# Patient Record
Sex: MALE | Race: BLACK OR AFRICAN AMERICAN | NOT HISPANIC OR LATINO | URBAN - METROPOLITAN AREA
[De-identification: names, ages, dates, MRNs, and addresses within clinical notes are randomized per-mention and may not be internally consistent; named-entity substitution may affect disease eponyms.]

---

## 2017-01-04 ENCOUNTER — INPATIENT (INPATIENT)
Facility: HOSPITAL | Age: 77
LOS: 7 days | Discharge: HOME HEALTH | DRG: 682 | End: 2017-01-12
Attending: HOSPITALIST | Admitting: HOSPITALIST
Payer: COMMERCIAL

## 2017-01-04 VITALS
HEIGHT: 78 IN | OXYGEN SATURATION: 98 % | RESPIRATION RATE: 20 BRPM | SYSTOLIC BLOOD PRESSURE: 131 MMHG | HEART RATE: 44 BPM | DIASTOLIC BLOOD PRESSURE: 61 MMHG | TEMPERATURE: 98 F | WEIGHT: 250 LBS

## 2017-01-04 DIAGNOSIS — Z95.810 PRESENCE OF AUTOMATIC (IMPLANTABLE) CARDIAC DEFIBRILLATOR: Chronic | ICD-10-CM

## 2017-01-04 LAB
ALBUMIN SERPL ELPH-MCNC: 3.4 G/DL — SIGNIFICANT CHANGE UP (ref 3.3–5.2)
ALP SERPL-CCNC: 74 U/L — SIGNIFICANT CHANGE UP (ref 40–120)
ALT FLD-CCNC: 20 U/L — SIGNIFICANT CHANGE UP
ANION GAP SERPL CALC-SCNC: 10 MMOL/L — SIGNIFICANT CHANGE UP (ref 5–17)
APTT BLD: 33.3 SEC — SIGNIFICANT CHANGE UP (ref 27.5–37.4)
AST SERPL-CCNC: 44 U/L — HIGH
BASOPHILS # BLD AUTO: 0 K/UL — SIGNIFICANT CHANGE UP (ref 0–0.2)
BASOPHILS NFR BLD AUTO: 0.2 % — SIGNIFICANT CHANGE UP (ref 0–2)
BILIRUB SERPL-MCNC: 0.4 MG/DL — SIGNIFICANT CHANGE UP (ref 0.4–2)
BUN SERPL-MCNC: 57 MG/DL — HIGH (ref 8–20)
CALCIUM SERPL-MCNC: 8.2 MG/DL — LOW (ref 8.6–10.2)
CHLORIDE SERPL-SCNC: 103 MMOL/L — SIGNIFICANT CHANGE UP (ref 98–107)
CK SERPL-CCNC: 93 U/L — SIGNIFICANT CHANGE UP (ref 30–200)
CO2 SERPL-SCNC: 23 MMOL/L — SIGNIFICANT CHANGE UP (ref 22–29)
CREAT SERPL-MCNC: 2.95 MG/DL — HIGH (ref 0.5–1.3)
EOSINOPHIL # BLD AUTO: 0 K/UL — SIGNIFICANT CHANGE UP (ref 0–0.5)
EOSINOPHIL NFR BLD AUTO: 0.8 % — SIGNIFICANT CHANGE UP (ref 0–5)
GLUCOSE SERPL-MCNC: 125 MG/DL — HIGH (ref 70–115)
HCT VFR BLD CALC: 26.8 % — LOW (ref 42–52)
HGB BLD-MCNC: 8 G/DL — LOW (ref 14–18)
INR BLD: 1.66 RATIO — HIGH (ref 0.88–1.16)
LYMPHOCYTES # BLD AUTO: 1.1 K/UL — SIGNIFICANT CHANGE UP (ref 1–4.8)
LYMPHOCYTES # BLD AUTO: 22.4 % — SIGNIFICANT CHANGE UP (ref 20–55)
MCHC RBC-ENTMCNC: 29.5 PG — SIGNIFICANT CHANGE UP (ref 27–31)
MCHC RBC-ENTMCNC: 29.9 G/DL — LOW (ref 32–36)
MCV RBC AUTO: 98.9 FL — HIGH (ref 80–94)
MONOCYTES # BLD AUTO: 0.4 K/UL — SIGNIFICANT CHANGE UP (ref 0–0.8)
MONOCYTES NFR BLD AUTO: 9.3 % — SIGNIFICANT CHANGE UP (ref 3–10)
NEUTROPHILS # BLD AUTO: 3.3 K/UL — SIGNIFICANT CHANGE UP (ref 1.8–8)
NEUTROPHILS NFR BLD AUTO: 67.1 % — SIGNIFICANT CHANGE UP (ref 37–73)
NT-PROBNP SERPL-SCNC: HIGH PG/ML (ref 0–300)
PLATELET # BLD AUTO: 160 K/UL — SIGNIFICANT CHANGE UP (ref 150–400)
POTASSIUM SERPL-MCNC: 5 MMOL/L — SIGNIFICANT CHANGE UP (ref 3.5–5.3)
POTASSIUM SERPL-SCNC: 5 MMOL/L — SIGNIFICANT CHANGE UP (ref 3.5–5.3)
PROT SERPL-MCNC: 8.6 G/DL — SIGNIFICANT CHANGE UP (ref 6.6–8.7)
PROTHROM AB SERPL-ACNC: 18.4 SEC — HIGH (ref 10–13.1)
RBC # BLD: 2.71 M/UL — LOW (ref 4.6–6.2)
RBC # FLD: 16.5 % — HIGH (ref 11–15.6)
SODIUM SERPL-SCNC: 136 MMOL/L — SIGNIFICANT CHANGE UP (ref 135–145)
TROPONIN T SERPL-MCNC: 0.13 NG/ML — CRITICAL HIGH (ref 0–0.06)
WBC # BLD: 4.86 K/UL — SIGNIFICANT CHANGE UP (ref 4.8–10.8)
WBC # FLD AUTO: 4.86 K/UL — SIGNIFICANT CHANGE UP (ref 4.8–10.8)

## 2017-01-04 PROCEDURE — 71010: CPT | Mod: 26

## 2017-01-04 PROCEDURE — 93010 ELECTROCARDIOGRAM REPORT: CPT

## 2017-01-04 NOTE — ED ADULT NURSE NOTE - OBJECTIVE STATEMENT
77 y/o male presents to ED with general "weakness" from home. Pt. is A/O x3 and denies any chest pain or SOB at this time. Pt. has diminished heart sounds with weak thready radial pulses. Capillary refill ,2 seconds. Pt. has clear lung sounds on left side and diminished breath sounds on right lung field upon auscultation. Bi-lateral pitting edema +2  of pt's legs. Pt. is connected to monitor and is sinus bradycardia on the monitor. O2 sat 95% on room air. Pt. has a defibrillator left chest. Pt. abdomen is distended and lower quadrants are tender to touch.

## 2017-01-04 NOTE — ED PROVIDER NOTE - NS ED MD SCRIBE ATTENDING SCRIBE SECTIONS
DISPOSITION/PHYSICAL EXAM/VITAL SIGNS( Pullset)/HISTORY OF PRESENT ILLNESS/REVIEW OF SYSTEMS/PAST MEDICAL/SURGICAL/SOCIAL HISTORY

## 2017-01-04 NOTE — ED PROVIDER NOTE - OBJECTIVE STATEMENT
77 y/o M pt w/ PMHx of defibrillator, CHF, GERD, and CAD was BIB EMS to the ED s/p syncope today. Pt also notes weakness x3 months, worsening today. As per EMS, pt was found on the floor of his house after a syncopal episode; pt states that his wife was helping him inside when he felt severely weak and lost consciousness. Pt states that he has been feeling weak for the past 3 months, but felt severely weak after taking a shower today. Pt denies chest pain, SOB, headache, dizziness, blood in stool, incontinence, or any other complaints. NKDA. Pt notes having bad knees. 75 y/o M pt w/ PMHx of defibrillator, CHF, GERD, and CAD was BIB EMS to the ED s/p syncope today. Pt also notes weakness x3 months, worsening today. As per EMS, pt was found on the floor of his house after a syncopal episode; pt states that his wife was helping him inside when he felt severely weak and lost consciousness. Pt states that he has been feeling weak for the past 3 months, but felt severely weak today. Pt denies chest pain, SOB, headache, dizziness, blood in stool, incontinence, or any other complaints. NKDA. Pt notes having bad knees.

## 2017-01-04 NOTE — ED PROVIDER NOTE - MUSCULOSKELETAL, MLM
Spine appears normal, range of motion is not limited, no muscle or joint tenderness. b/l knee swelling.

## 2017-01-04 NOTE — ED PROVIDER NOTE - PMH
Acute diastolic congestive heart failure    Anemia    CHF (congestive heart failure)    Coronary atherosclerosis    Depressed    Essential hypertension    GERD (gastroesophageal reflux disease)

## 2017-01-04 NOTE — ED PROVIDER NOTE - SKIN, MLM
Skin normal color for race, warm, dry and intact. No evidence of rash. Defibrillator palpated in L upper chest wall.

## 2017-01-05 DIAGNOSIS — N17.9 ACUTE KIDNEY FAILURE, UNSPECIFIED: ICD-10-CM

## 2017-01-05 DIAGNOSIS — R55 SYNCOPE AND COLLAPSE: ICD-10-CM

## 2017-01-05 DIAGNOSIS — I50.21 ACUTE SYSTOLIC (CONGESTIVE) HEART FAILURE: ICD-10-CM

## 2017-01-05 DIAGNOSIS — I10 ESSENTIAL (PRIMARY) HYPERTENSION: ICD-10-CM

## 2017-01-05 DIAGNOSIS — D64.9 ANEMIA, UNSPECIFIED: ICD-10-CM

## 2017-01-05 DIAGNOSIS — R00.1 BRADYCARDIA, UNSPECIFIED: ICD-10-CM

## 2017-01-05 DIAGNOSIS — N18.3 CHRONIC KIDNEY DISEASE, STAGE 3 (MODERATE): ICD-10-CM

## 2017-01-05 DIAGNOSIS — R18.8 OTHER ASCITES: ICD-10-CM

## 2017-01-05 LAB
DIGOXIN SERPL-MCNC: 1.65 NG/ML — SIGNIFICANT CHANGE UP (ref 0.8–2)
TROPONIN T SERPL-MCNC: 0.1 NG/ML — HIGH (ref 0–0.06)

## 2017-01-05 PROCEDURE — 99223 1ST HOSP IP/OBS HIGH 75: CPT

## 2017-01-05 PROCEDURE — 99285 EMERGENCY DEPT VISIT HI MDM: CPT

## 2017-01-05 PROCEDURE — 76775 US EXAM ABDO BACK WALL LIM: CPT | Mod: 26

## 2017-01-05 PROCEDURE — 70450 CT HEAD/BRAIN W/O DYE: CPT | Mod: 26

## 2017-01-05 PROCEDURE — 76705 ECHO EXAM OF ABDOMEN: CPT | Mod: 26

## 2017-01-05 RX ORDER — PANTOPRAZOLE SODIUM 20 MG/1
40 TABLET, DELAYED RELEASE ORAL
Qty: 0 | Refills: 0 | Status: DISCONTINUED | OUTPATIENT
Start: 2017-01-05 | End: 2017-01-12

## 2017-01-05 RX ORDER — HYDRALAZINE HCL 50 MG
10 TABLET ORAL THREE TIMES A DAY
Qty: 0 | Refills: 0 | Status: DISCONTINUED | OUTPATIENT
Start: 2017-01-05 | End: 2017-01-06

## 2017-01-05 RX ORDER — DONEPEZIL HYDROCHLORIDE 10 MG/1
10 TABLET, FILM COATED ORAL AT BEDTIME
Qty: 0 | Refills: 0 | Status: DISCONTINUED | OUTPATIENT
Start: 2017-01-05 | End: 2017-01-12

## 2017-01-05 RX ORDER — DEXTROSE 50 % IN WATER 50 %
12.5 SYRINGE (ML) INTRAVENOUS ONCE
Qty: 0 | Refills: 0 | Status: DISCONTINUED | OUTPATIENT
Start: 2017-01-05 | End: 2017-01-12

## 2017-01-05 RX ORDER — SODIUM CHLORIDE 9 MG/ML
1000 INJECTION, SOLUTION INTRAVENOUS
Qty: 0 | Refills: 0 | Status: DISCONTINUED | OUTPATIENT
Start: 2017-01-05 | End: 2017-01-12

## 2017-01-05 RX ORDER — APIXABAN 2.5 MG/1
1 TABLET, FILM COATED ORAL
Qty: 0 | Refills: 0 | COMMUNITY

## 2017-01-05 RX ORDER — OXYBUTYNIN CHLORIDE 5 MG
1 TABLET ORAL
Qty: 0 | Refills: 0 | COMMUNITY

## 2017-01-05 RX ORDER — FOLIC ACID 0.8 MG
1 TABLET ORAL DAILY
Qty: 0 | Refills: 0 | Status: DISCONTINUED | OUTPATIENT
Start: 2017-01-05 | End: 2017-01-06

## 2017-01-05 RX ORDER — DEXTROSE 50 % IN WATER 50 %
1 SYRINGE (ML) INTRAVENOUS ONCE
Qty: 0 | Refills: 0 | Status: DISCONTINUED | OUTPATIENT
Start: 2017-01-05 | End: 2017-01-12

## 2017-01-05 RX ORDER — DEXTROSE 50 % IN WATER 50 %
25 SYRINGE (ML) INTRAVENOUS ONCE
Qty: 0 | Refills: 0 | Status: DISCONTINUED | OUTPATIENT
Start: 2017-01-05 | End: 2017-01-12

## 2017-01-05 RX ORDER — SODIUM CHLORIDE 9 MG/ML
1000 INJECTION INTRAMUSCULAR; INTRAVENOUS; SUBCUTANEOUS ONCE
Qty: 0 | Refills: 0 | Status: COMPLETED | OUTPATIENT
Start: 2017-01-05 | End: 2017-01-05

## 2017-01-05 RX ORDER — ALBUTEROL 90 UG/1
2 AEROSOL, METERED ORAL
Qty: 0 | Refills: 0 | COMMUNITY

## 2017-01-05 RX ORDER — ISOSORBIDE MONONITRATE 60 MG/1
30 TABLET, EXTENDED RELEASE ORAL DAILY
Qty: 0 | Refills: 0 | Status: DISCONTINUED | OUTPATIENT
Start: 2017-01-05 | End: 2017-01-12

## 2017-01-05 RX ORDER — OXYBUTYNIN CHLORIDE 5 MG
5 TABLET ORAL THREE TIMES A DAY
Qty: 0 | Refills: 0 | Status: DISCONTINUED | OUTPATIENT
Start: 2017-01-05 | End: 2017-01-12

## 2017-01-05 RX ORDER — ALBUTEROL 90 UG/1
1 AEROSOL, METERED ORAL
Qty: 0 | Refills: 0 | Status: DISCONTINUED | OUTPATIENT
Start: 2017-01-05 | End: 2017-01-05

## 2017-01-05 RX ORDER — CITALOPRAM 10 MG/1
20 TABLET, FILM COATED ORAL DAILY
Qty: 0 | Refills: 0 | Status: DISCONTINUED | OUTPATIENT
Start: 2017-01-05 | End: 2017-01-12

## 2017-01-05 RX ORDER — BUDESONIDE AND FORMOTEROL FUMARATE DIHYDRATE 160; 4.5 UG/1; UG/1
1 AEROSOL RESPIRATORY (INHALATION)
Qty: 0 | Refills: 0 | Status: DISCONTINUED | OUTPATIENT
Start: 2017-01-05 | End: 2017-01-12

## 2017-01-05 RX ORDER — FERROUS SULFATE 325(65) MG
325 TABLET ORAL DAILY
Qty: 0 | Refills: 0 | Status: DISCONTINUED | OUTPATIENT
Start: 2017-01-05 | End: 2017-01-12

## 2017-01-05 RX ORDER — INSULIN LISPRO 100/ML
VIAL (ML) SUBCUTANEOUS
Qty: 0 | Refills: 0 | Status: DISCONTINUED | OUTPATIENT
Start: 2017-01-05 | End: 2017-01-12

## 2017-01-05 RX ORDER — BUDESONIDE AND FORMOTEROL FUMARATE DIHYDRATE 160; 4.5 UG/1; UG/1
2 AEROSOL RESPIRATORY (INHALATION)
Qty: 0 | Refills: 0 | COMMUNITY

## 2017-01-05 RX ORDER — TAMSULOSIN HYDROCHLORIDE 0.4 MG/1
0.4 CAPSULE ORAL AT BEDTIME
Qty: 0 | Refills: 0 | Status: DISCONTINUED | OUTPATIENT
Start: 2017-01-05 | End: 2017-01-12

## 2017-01-05 RX ORDER — AMITRIPTYLINE HCL 25 MG
1 TABLET ORAL
Qty: 0 | Refills: 0 | COMMUNITY

## 2017-01-05 RX ORDER — METOPROLOL TARTRATE 50 MG
1 TABLET ORAL
Qty: 0 | Refills: 0 | COMMUNITY

## 2017-01-05 RX ORDER — SODIUM CHLORIDE 9 MG/ML
1000 INJECTION INTRAMUSCULAR; INTRAVENOUS; SUBCUTANEOUS
Qty: 0 | Refills: 0 | Status: DISCONTINUED | OUTPATIENT
Start: 2017-01-05 | End: 2017-01-06

## 2017-01-05 RX ORDER — ALBUTEROL 90 UG/1
1 AEROSOL, METERED ORAL
Qty: 0 | Refills: 0 | Status: DISCONTINUED | OUTPATIENT
Start: 2017-01-05 | End: 2017-01-12

## 2017-01-05 RX ORDER — ATORVASTATIN CALCIUM 80 MG/1
20 TABLET, FILM COATED ORAL AT BEDTIME
Qty: 0 | Refills: 0 | Status: DISCONTINUED | OUTPATIENT
Start: 2017-01-05 | End: 2017-01-12

## 2017-01-05 RX ORDER — HEPARIN SODIUM 5000 [USP'U]/ML
5000 INJECTION INTRAVENOUS; SUBCUTANEOUS EVERY 8 HOURS
Qty: 0 | Refills: 0 | Status: DISCONTINUED | OUTPATIENT
Start: 2017-01-06 | End: 2017-01-06

## 2017-01-05 RX ORDER — SITAGLIPTIN 50 MG/1
1 TABLET, FILM COATED ORAL
Qty: 0 | Refills: 0 | COMMUNITY

## 2017-01-05 RX ORDER — GLUCAGON INJECTION, SOLUTION 0.5 MG/.1ML
1 INJECTION, SOLUTION SUBCUTANEOUS ONCE
Qty: 0 | Refills: 0 | Status: DISCONTINUED | OUTPATIENT
Start: 2017-01-05 | End: 2017-01-12

## 2017-01-05 RX ADMIN — SODIUM CHLORIDE 1000 MILLILITER(S): 9 INJECTION INTRAMUSCULAR; INTRAVENOUS; SUBCUTANEOUS at 06:29

## 2017-01-05 RX ADMIN — PANTOPRAZOLE SODIUM 40 MILLIGRAM(S): 20 TABLET, DELAYED RELEASE ORAL at 11:38

## 2017-01-05 RX ADMIN — SODIUM CHLORIDE 50 MILLILITER(S): 9 INJECTION INTRAMUSCULAR; INTRAVENOUS; SUBCUTANEOUS at 17:58

## 2017-01-05 RX ADMIN — Medication 325 MILLIGRAM(S): at 11:44

## 2017-01-05 RX ADMIN — CITALOPRAM 20 MILLIGRAM(S): 10 TABLET, FILM COATED ORAL at 11:44

## 2017-01-05 RX ADMIN — Medication 10 MILLIGRAM(S): at 23:38

## 2017-01-05 RX ADMIN — Medication 5 MILLIGRAM(S): at 23:39

## 2017-01-05 RX ADMIN — Medication 1 MILLIGRAM(S): at 11:38

## 2017-01-05 RX ADMIN — ATORVASTATIN CALCIUM 20 MILLIGRAM(S): 80 TABLET, FILM COATED ORAL at 23:39

## 2017-01-05 RX ADMIN — TAMSULOSIN HYDROCHLORIDE 0.4 MILLIGRAM(S): 0.4 CAPSULE ORAL at 23:39

## 2017-01-05 RX ADMIN — ISOSORBIDE MONONITRATE 30 MILLIGRAM(S): 60 TABLET, EXTENDED RELEASE ORAL at 11:38

## 2017-01-05 RX ADMIN — Medication 5 MILLIGRAM(S): at 13:45

## 2017-01-05 RX ADMIN — Medication 10 MILLIGRAM(S): at 13:45

## 2017-01-05 RX ADMIN — DONEPEZIL HYDROCHLORIDE 10 MILLIGRAM(S): 10 TABLET, FILM COATED ORAL at 23:43

## 2017-01-05 NOTE — CONSULT NOTE ADULT - PROBLEM SELECTOR RECOMMENDATION 9
- continue to hold diuretics  - gentle IVF and watch for signs of CHF  - renal sono and urine studies

## 2017-01-05 NOTE — H&P ADULT. - ASSESSMENT
1. near syncope likely sec to dehydration sec to diuretics- hold diuretics.avoid nephrotoxics. renal cx as he will need renal outpatient also.   will do ua and u c/s to r/o infection  2. CHf sys chr ef 20%- per family has aicd- called cards to interrogation of device given ekg with bradycardia. holding avn blockers for now. telemonitoring  3. jaclyn on ckd 3- as above  4. Dm- is on ohgs at home. will do insulin here.  5. htn- controlled. cont home meds. except the above held ones and monitor  6. Pafib- on eliquis. since jaclyn on ckd, likely not a candidate for eliquis, need cards to address same. held for now.  7. ? ascites reported. if needed during stay will assess same.  DVT prophyalx- heparin from tomorrow 1. near syncope likely sec to dehydration sec to diuretics- hold diuretics.avoid nephrotoxics. renal cx as he will need renal outpatient also.   will do ua and u c/s to r/o infection  2. CHf sys chr ef 20%- per family has aicd- called cards to interrogation of device given ekg with bradycardia. holding avn blockers for now. telemonitoring  3. jaclyn on ckd 3- as above  4. Dm- is on ohgs at home. will do insulin here.  5. htn- controlled. cont home meds. except the above held ones and monitor  6. Pafib- on eliquis. since jaclyn on ckd, likely not a candidate for eliquis, need cards to address same. held for now.  7. ascites - confirm prior to IR procedure  DVT prophyalx- heparin from tomorrow

## 2017-01-05 NOTE — ED ADULT NURSE REASSESSMENT NOTE - NS ED NURSE REASSESS COMMENT FT1
Pt. sleeping at this time. Vitals are within patients baseline
Pt.'s wife leaving for the night and requested to be updated when pt. is being moved to admitted floor. Phone number is in pt. info.
pt resting comfortably vss resp even unlabored
pt given 2 apple juices and some small amount of breakfast @ 1111
rc'd pt from nigh tshift asleep but arousable vss resp evn unlabored will monitor

## 2017-01-05 NOTE — H&P ADULT. - HISTORY OF PRESENT ILLNESS
pt presents with- he went to work yesterday and then called his wife to say eh was feeling weak. when he drove back home he couldnt get out of the car and just sat down by the car, and his wife with neighbors helped him up and called 911. he has had his doctors appt with cards 3 days ago- was told all Ok. urology f/u informed him that he had ascites. denies any other associated symptoms  PMH- CHF- ef 20% has aicd,DM htn, pafib on eliquis, bph, ? gib  sh- lives with wife, denies habits

## 2017-01-05 NOTE — CONSULT NOTE ADULT - SUBJECTIVE AND OBJECTIVE BOX
Patient is a 76y old  Male who presents with a chief complaint of weakness. near syncope (05 Jan 2017 09:36)      HPI:  The pt is a 75yo B male who presented with profound weakness and near syncopal episode.  We are called regarding renal failure seen on admitting blood work.  Pt unsure regarding renal disease in past.      PAST MEDICAL & SURGICAL HISTORY:  CHF (congestive heart failure)  Depressed  GERD (gastroesophageal reflux disease)  Coronary atherosclerosis  Anemia  Acute diastolic congestive heart failure  Essential hypertension  AICD (automatic cardioverter/defibrillator) present  PAF  Hyperlipidemia    FAMILY HISTORY:  No pertinent family history in first degree relatives      Social History:    MEDICATIONS  (STANDING):  tamsulosin 0.4milliGRAM(s) Oral at bedtime  isosorbide   mononitrate ER Tablet (IMDUR) 30milliGRAM(s) Oral daily  citalopram 20milliGRAM(s) Oral daily  atorvastatin 20milliGRAM(s) Oral at bedtime  buDESOnide 160 MICROgram(s)/formoterol 4.5 MICROgram(s) Inhaler 1Puff(s) Inhalation two times a day  donepezil 10milliGRAM(s) Oral at bedtime  pantoprazole    Tablet 40milliGRAM(s) Oral before breakfast  oxybutynin 5milliGRAM(s) Oral three times a day  folic acid 1milliGRAM(s) Oral daily  hydrALAZINE 10milliGRAM(s) Oral three times a day  insulin lispro (HumaLOG) corrective regimen sliding scale  SubCutaneous three times a day before meals  dextrose 5%. 1000milliLiter(s) IV Continuous <Continuous>  dextrose 50% Injectable 12.5Gram(s) IV Push once  dextrose 50% Injectable 25Gram(s) IV Push once  dextrose 50% Injectable 25Gram(s) IV Push once  ferrous    sulfate 325milliGRAM(s) Oral daily    MEDICATIONS  (PRN):  dextrose Gel 1Dose(s) Oral once PRN Blood Glucose LESS THAN 70 milliGRAM(s)/deciliter  glucagon  Injectable 1milliGRAM(s) IntraMuscular once PRN Glucose LESS THAN 70 milligrams/deciliter  ALBUTerol    90 MICROgram(s) HFA Inhaler 1Puff(s) Inhalation four times a day PRN Shortness of Breath and/or Wheezing      Allergies    No Known Allergies    Intolerances        REVIEW OF SYSTEMS:    CONSTITUTIONAL: + fatigue  EYES: No eye pain, visual disturbances, or discharge  ENMT:  No difficulty hearing, tinnitus, vertigo; No sinus or throat pain  NECK: No pain or stiffness  RESPIRATORY: No cough, wheezing, chills or hemoptysis; No shortness of breath  CARDIOVASCULAR: No chest pain, palpitations, dizziness, or leg swelling  GASTROINTESTINAL: No abdominal or epigastric pain. No nausea, vomiting, or hematemesis; No diarrhea or constipation. No melena or hematochezia.  GENITOURINARY: No dysuria, frequency, hematuria, or incontinence  NEUROLOGICAL: Weakness, dizzyness  SKIN: No itching, burning, rashes, or lesions   LYMPH NODES: No enlarged glands  ENDOCRINE: No heat or cold intolerance; No hair loss  MUSCULOSKELETAL: No joint pain or swelling; No muscle, back, or extremity pain      Vital Signs Last 24 Hrs  T(C): 36.4, Max: 36.8 (01-04 @ 21:33)  T(F): 97.5, Max: 98.2 (01-04 @ 21:33)  HR: 55 (40 - 56)  BP: 144/67 (130/67 - 167/90)  BP(mean): --  RR: 16 (16 - 20)  SpO2: 97% (96% - 98%)    PHYSICAL EXAM:    GENERAL: NAD  HEAD:  Atraumatic, Normocephalic  EYES: EOMI, PERRLA, conjunctiva and sclera clear  ENMT: Dry mucous membranes   NECK: Supple, No JVD  NERVOUS SYSTEM:  Alert & Oriented X3  CHEST/LUNG: Clear to percussion bilaterally; No rales, rhonchi, wheezing, or rubs  HEART: No rub  ABDOMEN: Soft, Nontender, Nondistended; Bowel sounds present  EXTREMITIES:  2+ Peripheral Pulses, No clubbing, cyanosis, or edema      LABS:                        8.0    4.86  )-----------( 160      ( 04 Jan 2017 22:03 )             26.8     04 Jan 2017 22:03    136    |  103    |  57.0   ----------------------------<  125    5.0     |  23.0   |  2.95     Ca    8.2        04 Jan 2017 22:03    TPro  8.6    /  Alb  3.4    /  TBili  0.4    /  DBili  x      /  AST  44     /  ALT  20     /  AlkPhos  74     04 Jan 2017 22:03    PT/INR - ( 04 Jan 2017 22:03 )   PT: 18.4 sec;   INR: 1.66 ratio         PTT - ( 04 Jan 2017 22:03 )  PTT:33.3 sec        RADIOLOGY & ADDITIONAL TESTS:   EXAM:  CHEST SINGLE VIEW FRONTAL                          PROCEDURE DATE:  01/04/2017        INTERPRETATION:    INDICATION: 76-year-old status post fall vascular congestion.    Single frontal view of chest dated 1/4/2017 10:16 PM.  Prior radiographs   of 10/1/2016.    FINDINGS /   IMPRESSION:     There are bibasilar atelectasis and probable tiny pleural effusion. There   is cardiomegaly. There is pacemaker distal leads in region of right   ventricle.    There are degenerative changes.

## 2017-01-05 NOTE — CONSULT NOTE ADULT - PROBLEM SELECTOR RECOMMENDATION 3
ok to hold diuretics for now as per renal. follow creatine. Ascites and low cardiac output could be sharing in HAZEL.

## 2017-01-05 NOTE — CONSULT NOTE ADULT - PROBLEM SELECTOR RECOMMENDATION 9
primarily right sided heart failure ( acites, LE edema ) , with no evidence of pulmonary congestion. Can hold off diuretics for now due to HAZEL , likely also sharing in HAZEL is  low cardiac output 2ry to RV dysfunction. continue imdur , hydralazine , hold off BB (for bradycardia ) and continue holding ACEI and digoxin

## 2017-01-05 NOTE — CONSULT NOTE ADULT - ASSESSMENT
A 77 y/o male with PMH of NICM EF 20-25% with markedly enlarged RV and moderate RV systolic dysfunction and severe TR presents with generalized weakness and right sided heart failure as well as HAZEL

## 2017-01-06 DIAGNOSIS — I50.21 ACUTE SYSTOLIC (CONGESTIVE) HEART FAILURE: ICD-10-CM

## 2017-01-06 LAB
ALBUMIN FLD-MCNC: 1.8 G/DL — SIGNIFICANT CHANGE UP
ANION GAP SERPL CALC-SCNC: 13 MMOL/L — SIGNIFICANT CHANGE UP (ref 5–17)
B PERT IGG+IGM PNL SER: ABNORMAL
BUN SERPL-MCNC: 59 MG/DL — HIGH (ref 8–20)
CALCIUM SERPL-MCNC: 8.5 MG/DL — LOW (ref 8.6–10.2)
CHLORIDE SERPL-SCNC: 102 MMOL/L — SIGNIFICANT CHANGE UP (ref 98–107)
CO2 SERPL-SCNC: 21 MMOL/L — LOW (ref 22–29)
COLOR FLD: ABNORMAL
CREAT SERPL-MCNC: 2.53 MG/DL — HIGH (ref 0.5–1.3)
FLUID INTAKE SUBSTANCE CLASS: SIGNIFICANT CHANGE UP
FLUID SEGMENTED GRANULOCYTES: 9 % — SIGNIFICANT CHANGE UP
GLUCOSE FLD-MCNC: 109 MG/DL — SIGNIFICANT CHANGE UP
GLUCOSE SERPL-MCNC: 105 MG/DL — SIGNIFICANT CHANGE UP (ref 70–115)
GRAM STN FLD: SIGNIFICANT CHANGE UP
HBA1C BLD-MCNC: 5.2 % — SIGNIFICANT CHANGE UP (ref 4–5.6)
LYMPHOCYTES # FLD: 71 % — SIGNIFICANT CHANGE UP
MESOTHL CELL # FLD: 15 % — SIGNIFICANT CHANGE UP
MONOS+MACROS # FLD: 5 % — SIGNIFICANT CHANGE UP
POTASSIUM SERPL-MCNC: 5.4 MMOL/L — HIGH (ref 3.5–5.3)
POTASSIUM SERPL-SCNC: 5.4 MMOL/L — HIGH (ref 3.5–5.3)
PROT FLD-MCNC: 4.4 G/DL — SIGNIFICANT CHANGE UP
RCV VOL RI: 2285 /UL — HIGH (ref 0–5)
SODIUM SERPL-SCNC: 136 MMOL/L — SIGNIFICANT CHANGE UP (ref 135–145)
SPECIMEN SOURCE: SIGNIFICANT CHANGE UP
TOTAL NUCLEATED CELL COUNT, BODY FLUID: 212 /UL — HIGH (ref 0–5)
TUBE TYPE: SIGNIFICANT CHANGE UP

## 2017-01-06 PROCEDURE — 99233 SBSQ HOSP IP/OBS HIGH 50: CPT

## 2017-01-06 PROCEDURE — 49083 ABD PARACENTESIS W/IMAGING: CPT

## 2017-01-06 PROCEDURE — 88112 CYTOPATH CELL ENHANCE TECH: CPT | Mod: 26

## 2017-01-06 PROCEDURE — 88305 TISSUE EXAM BY PATHOLOGIST: CPT | Mod: 26

## 2017-01-06 RX ORDER — METOPROLOL TARTRATE 50 MG
50 TABLET ORAL DAILY
Qty: 0 | Refills: 0 | Status: DISCONTINUED | OUTPATIENT
Start: 2017-01-06 | End: 2017-01-12

## 2017-01-06 RX ORDER — ALBUMIN HUMAN 25 %
100 VIAL (ML) INTRAVENOUS ONCE
Qty: 0 | Refills: 0 | Status: COMPLETED | OUTPATIENT
Start: 2017-01-06 | End: 2017-01-06

## 2017-01-06 RX ORDER — MORPHINE SULFATE 50 MG/1
2 CAPSULE, EXTENDED RELEASE ORAL ONCE
Qty: 0 | Refills: 0 | Status: DISCONTINUED | OUTPATIENT
Start: 2017-01-06 | End: 2017-01-06

## 2017-01-06 RX ORDER — HYDRALAZINE HCL 50 MG
25 TABLET ORAL THREE TIMES A DAY
Qty: 0 | Refills: 0 | Status: DISCONTINUED | OUTPATIENT
Start: 2017-01-06 | End: 2017-01-12

## 2017-01-06 RX ORDER — FOLIC ACID 0.8 MG
1 TABLET ORAL DAILY
Qty: 0 | Refills: 0 | Status: DISCONTINUED | OUTPATIENT
Start: 2017-01-06 | End: 2017-01-12

## 2017-01-06 RX ORDER — APIXABAN 2.5 MG/1
2.5 TABLET, FILM COATED ORAL
Qty: 0 | Refills: 0 | Status: DISCONTINUED | OUTPATIENT
Start: 2017-01-06 | End: 2017-01-12

## 2017-01-06 RX ORDER — INFLUENZA VIRUS VACCINE 15; 15; 15; 15 UG/.5ML; UG/.5ML; UG/.5ML; UG/.5ML
0.5 SUSPENSION INTRAMUSCULAR ONCE
Qty: 0 | Refills: 0 | Status: COMPLETED | OUTPATIENT
Start: 2017-01-06 | End: 2017-01-06

## 2017-01-06 RX ORDER — SODIUM POLYSTYRENE SULFONATE 4.1 MEQ/G
30 POWDER, FOR SUSPENSION ORAL ONCE
Qty: 0 | Refills: 0 | Status: COMPLETED | OUTPATIENT
Start: 2017-01-06 | End: 2017-01-06

## 2017-01-06 RX ORDER — ERYTHROPOIETIN 10000 [IU]/ML
20000 INJECTION, SOLUTION INTRAVENOUS; SUBCUTANEOUS
Qty: 0 | Refills: 0 | Status: DISCONTINUED | OUTPATIENT
Start: 2017-01-06 | End: 2017-01-12

## 2017-01-06 RX ADMIN — MORPHINE SULFATE 2 MILLIGRAM(S): 50 CAPSULE, EXTENDED RELEASE ORAL at 03:40

## 2017-01-06 RX ADMIN — ATORVASTATIN CALCIUM 20 MILLIGRAM(S): 80 TABLET, FILM COATED ORAL at 21:32

## 2017-01-06 RX ADMIN — ERYTHROPOIETIN 20000 UNIT(S): 10000 INJECTION, SOLUTION INTRAVENOUS; SUBCUTANEOUS at 18:40

## 2017-01-06 RX ADMIN — Medication 10 MILLIGRAM(S): at 06:11

## 2017-01-06 RX ADMIN — SODIUM POLYSTYRENE SULFONATE 30 GRAM(S): 4.1 POWDER, FOR SUSPENSION ORAL at 13:04

## 2017-01-06 RX ADMIN — Medication 50 MILLIGRAM(S): at 13:04

## 2017-01-06 RX ADMIN — Medication 5 MILLIGRAM(S): at 06:10

## 2017-01-06 RX ADMIN — Medication 5 MILLIGRAM(S): at 21:32

## 2017-01-06 RX ADMIN — CITALOPRAM 20 MILLIGRAM(S): 10 TABLET, FILM COATED ORAL at 13:00

## 2017-01-06 RX ADMIN — SODIUM CHLORIDE 50 MILLILITER(S): 9 INJECTION INTRAMUSCULAR; INTRAVENOUS; SUBCUTANEOUS at 11:39

## 2017-01-06 RX ADMIN — DONEPEZIL HYDROCHLORIDE 10 MILLIGRAM(S): 10 TABLET, FILM COATED ORAL at 21:31

## 2017-01-06 RX ADMIN — HEPARIN SODIUM 5000 UNIT(S): 5000 INJECTION INTRAVENOUS; SUBCUTANEOUS at 06:11

## 2017-01-06 RX ADMIN — Medication 1 MILLIGRAM(S): at 13:00

## 2017-01-06 RX ADMIN — Medication 25 MILLIGRAM(S): at 21:32

## 2017-01-06 RX ADMIN — Medication 10 MILLIGRAM(S): at 13:04

## 2017-01-06 RX ADMIN — APIXABAN 2.5 MILLIGRAM(S): 2.5 TABLET, FILM COATED ORAL at 21:32

## 2017-01-06 RX ADMIN — PANTOPRAZOLE SODIUM 40 MILLIGRAM(S): 20 TABLET, DELAYED RELEASE ORAL at 06:11

## 2017-01-06 RX ADMIN — MORPHINE SULFATE 2 MILLIGRAM(S): 50 CAPSULE, EXTENDED RELEASE ORAL at 02:42

## 2017-01-06 RX ADMIN — Medication 50 MILLILITER(S): at 20:40

## 2017-01-06 RX ADMIN — Medication 325 MILLIGRAM(S): at 13:00

## 2017-01-06 RX ADMIN — BUDESONIDE AND FORMOTEROL FUMARATE DIHYDRATE 1 PUFF(S): 160; 4.5 AEROSOL RESPIRATORY (INHALATION) at 21:15

## 2017-01-06 RX ADMIN — TAMSULOSIN HYDROCHLORIDE 0.4 MILLIGRAM(S): 0.4 CAPSULE ORAL at 21:31

## 2017-01-06 RX ADMIN — Medication 5 MILLIGRAM(S): at 13:00

## 2017-01-06 RX ADMIN — ISOSORBIDE MONONITRATE 30 MILLIGRAM(S): 60 TABLET, EXTENDED RELEASE ORAL at 13:00

## 2017-01-06 NOTE — PROGRESS NOTE ADULT - SUBJECTIVE AND OBJECTIVE BOX
NEPHROLOGY INTERVAL HPI/OVERNIGHT EVENTS:  cardio evaluation noted   No SOB laying flat  ACE, diuretics, dig held  C/O Abd distention , No pain . + BM     MEDICATIONS  (STANDING):  tamsulosin 0.4milliGRAM(s) Oral at bedtime  isosorbide   mononitrate ER Tablet (IMDUR) 30milliGRAM(s) Oral daily  citalopram 20milliGRAM(s) Oral daily  atorvastatin 20milliGRAM(s) Oral at bedtime  buDESOnide 160 MICROgram(s)/formoterol 4.5 MICROgram(s) Inhaler 1Puff(s) Inhalation two times a day  donepezil 10milliGRAM(s) Oral at bedtime  pantoprazole    Tablet 40milliGRAM(s) Oral before breakfast  oxybutynin 5milliGRAM(s) Oral three times a day  folic acid 1milliGRAM(s) Oral daily  hydrALAZINE 10milliGRAM(s) Oral three times a day  heparin  Injectable 5000Unit(s) SubCutaneous every 8 hours  insulin lispro (HumaLOG) corrective regimen sliding scale  SubCutaneous three times a day before meals  dextrose 5%. 1000milliLiter(s) IV Continuous <Continuous>  dextrose 50% Injectable 12.5Gram(s) IV Push once  dextrose 50% Injectable 25Gram(s) IV Push once  dextrose 50% Injectable 25Gram(s) IV Push once  ferrous    sulfate 325milliGRAM(s) Oral daily  sodium chloride 0.9%. 1000milliLiter(s) IV Continuous <Continuous>  influenza   Vaccine 0.5milliLiter(s) IntraMuscular once    MEDICATIONS  (PRN):  dextrose Gel 1Dose(s) Oral once PRN Blood Glucose LESS THAN 70 milliGRAM(s)/deciliter  glucagon  Injectable 1milliGRAM(s) IntraMuscular once PRN Glucose LESS THAN 70 milligrams/deciliter  ALBUTerol    90 MICROgram(s) HFA Inhaler 1Puff(s) Inhalation four times a day PRN Shortness of Breath and/or Wheezing      Allergies    No Known Allergies    Intolerances            Vital Signs Last 24 Hrs  T(C): 36.7, Max: 36.7 (01-05 @ 20:42)  T(F): 98, Max: 98 (01-05 @ 20:42)  HR: 56 (46 - 56)  BP: 159/82 (144/67 - 166/95)  BP(mean): --  RR: 18 (16 - 19)  SpO2: 98% (97% - 98%)  Daily     Daily   I&O's Detail    I&O's Summary      PHYSICAL EXAM:  HEAD:  Atraumatic, No edema   EYES: EOMI, PERRLA, conjunctiva and sclera clear  ENMT: Dry mucous membranes   NECK: Supple, No JVD  CHEST/LUNG: EAE , no wheeze  HEART: No rub, + rocky   ABDOMEN: Distended , soft . No rigidity or rebound   EXT ++ edema     LABS:                        8.0    4.86  )-----------( 160      ( 04 Jan 2017 22:03 )             26.8     06 Jan 2017 08:53    136    |  102    |  59.0   ----------------------------<  105    5.4     |  21.0   |  2.53     Ca    8.5        06 Jan 2017 08:53    TPro  8.6    /  Alb  3.4    /  TBili  0.4    /  DBili  x      /  AST  44     /  ALT  20     /  AlkPhos  74     04 Jan 2017 22:03    PT/INR - ( 04 Jan 2017 22:03 )   PT: 18.4 sec;   INR: 1.66 ratio         PTT - ( 04 Jan 2017 22:03 )  PTT:33.3 sec            RADIOLOGY & ADDITIONAL TESTS:

## 2017-01-06 NOTE — PROGRESS NOTE ADULT - ASSESSMENT
Assessment and Recommendation:   · Assessment		  CRF(III) ==> Screat =1.4mg/dl in Oct 2016  ARF likely due to dehydration from overdiuresis    Problem/Recommendation - 1:  Problem: CRF (chronic renal failure), stage 3 (moderate). Recommendation: - continue to hold diuretics  - gentle IVF and watch for signs of CHF  - NO hydro , + ascites on ultrasound  ACE , lasix and digoxin on hold  Consider future paracentesis  cardiology follow up noted   hydralazine and Imdur ALR  Dig level OK     Problem/Recommendation - 2:  ·  Problem: Anemia, unspecified type.  Recommendation: - check Fe stores and serum immunofixation.   Add epogen and folate

## 2017-01-06 NOTE — PROVIDER CONTACT NOTE (OTHER) - SITUATION
dr adair called to clarify eloquis order. as per dr adair okay to give eloquis tonight. as per Dr Adair also ok to D/C IVF but to consult with medicine prior
dr vargas called and notified of BP.

## 2017-01-06 NOTE — PROVIDER CONTACT NOTE (OTHER) - ACTION/TREATMENT ORDERED:
as per Cresencio kauffman to given po medications as pt is NPO.
as per dr vargas d/c IVF. administer albumin asap
jennifer rescheduled for 2200. dr vargas called to review plan of care. telephone order received from Dr Vargas to D/C IVF

## 2017-01-06 NOTE — PROGRESS NOTE ADULT - ASSESSMENT
A 75 y/o male with PMH of NICM EF 20-25% with markedly enlarged RV and moderate RV systolic dysfunction and severe TR presents with generalized weakness and right sided heart failure as well as HAZEL

## 2017-01-06 NOTE — PROGRESS NOTE ADULT - SUBJECTIVE AND OBJECTIVE BOX
HEALTH ISSUES - PROBLEM Dx:  right heart failure with ascites and jaclyn on ckd likely sec to diuresis    PAST MEDICAL & SURGICAL HISTORY:  CHF (congestive heart failure)  Depressed  GERD (gastroesophageal reflux disease)  Coronary atherosclerosis  Anemia  Acute diastolic congestive heart failure  Essential hypertension  AICD (automatic cardioverter/defibrillator) present      INTERVAL HPI/ OVERNIGHT EVENTS:  feels comfortable today, and better. concerned about his ascites and finds it difficult to manage with breathing and ADLs sec to same.  denies chest pain, nausea, vomits, cough, SOB, fever, HA    MEDICATIONS  (STANDING):  tamsulosin 0.4milliGRAM(s) Oral at bedtime  isosorbide   mononitrate ER Tablet (IMDUR) 30milliGRAM(s) Oral daily  citalopram 20milliGRAM(s) Oral daily  atorvastatin 20milliGRAM(s) Oral at bedtime  buDESOnide 160 MICROgram(s)/formoterol 4.5 MICROgram(s) Inhaler 1Puff(s) Inhalation two times a day  donepezil 10milliGRAM(s) Oral at bedtime  pantoprazole    Tablet 40milliGRAM(s) Oral before breakfast  oxybutynin 5milliGRAM(s) Oral three times a day  folic acid 1milliGRAM(s) Oral daily  hydrALAZINE 10milliGRAM(s) Oral three times a day  heparin  Injectable 5000Unit(s) SubCutaneous every 8 hours  insulin lispro (HumaLOG) corrective regimen sliding scale  SubCutaneous three times a day before meals  dextrose 5%. 1000milliLiter(s) IV Continuous <Continuous>  dextrose 50% Injectable 12.5Gram(s) IV Push once  dextrose 50% Injectable 25Gram(s) IV Push once  dextrose 50% Injectable 25Gram(s) IV Push once  ferrous    sulfate 325milliGRAM(s) Oral daily  sodium chloride 0.9%. 1000milliLiter(s) IV Continuous <Continuous>  influenza   Vaccine 0.5milliLiter(s) IntraMuscular once  epoetin damon Injectable 85187Fnym(s) SubCutaneous every 7 days  folic acid 1milliGRAM(s) Oral daily    MEDICATIONS  (PRN):  dextrose Gel 1Dose(s) Oral once PRN Blood Glucose LESS THAN 70 milliGRAM(s)/deciliter  glucagon  Injectable 1milliGRAM(s) IntraMuscular once PRN Glucose LESS THAN 70 milligrams/deciliter  ALBUTerol    90 MICROgram(s) HFA Inhaler 1Puff(s) Inhalation four times a day PRN Shortness of Breath and/or Wheezing      Allergies    No Known Allergies    Intolerances        Vital Signs Last 24 Hrs  T(C): 36.7, Max: 36.7 (01-05 @ 20:42)  T(F): 98, Max: 98 (01-05 @ 20:42)  HR: 54 (46 - 56)  BP: 148/52 (144/67 - 166/95)  BP(mean): --  RR: 16 (16 - 19)  SpO2: 98% (97% - 98%)    ACCUCHECKS    PHYSICAL EXAM-  GENERAL: NAD, well-groomed, well-developed  HEAD:  Atraumatic, Normocephalic  EYES: EOMI, PERRLA, conjunctiva and sclera clear  ENMT: No tonsillar erythema, exudates, or enlargement; Moist mucous membranes, Good dentition, No lesions  NECK: Supple, No JVD, Normal thyroid  NERVOUS SYSTEM:  Alert & Oriented X 3, Motor Strength 3-4/5 B/L upper and lower extremities;   CHEST/LUNG: Clear to percussion bilaterally; No rales, rhonchi, wheezing, or rubs  HEART: Regular rate and rhythm; No  rubs, or gallops; sys murmur +  ABDOMEN: Soft, Nontender, gross ascites; Bowel sounds present  EXTREMITIES:  2+ Peripheral Pulses, No clubbing, cyanosis, mild edema, chr skin changes, dry skin +  LYMPH: No lymphadenopathy noted  SKIN: No rashes or lesions    LABS:                        8.0    4.86  )-----------( 160      ( 04 Jan 2017 22:03 )             26.8     06 Jan 2017 08:53    136    |  102    |  59.0   ----------------------------<  105    5.4     |  21.0   |  2.53     Ca    8.5        06 Jan 2017 08:53    TPro  8.6    /  Alb  3.4    /  TBili  0.4    /  DBili  x      /  AST  44     /  ALT  20     /  AlkPhos  74     04 Jan 2017 22:03    PT/INR - ( 04 Jan 2017 22:03 )   PT: 18.4 sec;   INR: 1.66 ratio         PTT - ( 04 Jan 2017 22:03 )  PTT:33.3 sec    RADIOLOGY & ADDITIONAL TESTS:    Assessment and Plan  1. near syncope likely sec to dehydration sec to diuretics- hold diuretics.avoid nephrotoxics. renal cx as he will need renal outpatient also.   per renal no further diuresis. gentle IVF started. ace i and dig still on hold.  2. CHf sys chr ef 20%- per family has aicd- per cards rate not an issue. will resume BB  3. jaclyn on ckd 3- as above  4. Dm- is on ohgs at home. will do insulin here.  5. htn- controlled. cont home meds. except the above held ones and monitor  6. Pafib- on eliquis. since jaclyn on ckd, likely not a candidate for eliquis, need cards to address same. held for now.  7. ascites - going for IR paracentesis today. Should help improve renal fxn and then will address ace i  8. Arrythmia on tele- dig toxicity ruled out with therapeutic levels  9. hyperkalemia sec to jaclyn- will give kayexalate and monitor on tele for arrythmia. sugars controlled.  DVT prophyalx- heparin from today  Discussed with: Patient, family, RN, CM, Consultants  Plan of care/ Discharge planning discussed.    Visit Time: 50 mins

## 2017-01-07 LAB
ANION GAP SERPL CALC-SCNC: 9 MMOL/L — SIGNIFICANT CHANGE UP (ref 5–17)
BUN SERPL-MCNC: 52 MG/DL — HIGH (ref 8–20)
CALCIUM SERPL-MCNC: 8.5 MG/DL — LOW (ref 8.6–10.2)
CHLORIDE SERPL-SCNC: 107 MMOL/L — SIGNIFICANT CHANGE UP (ref 98–107)
CO2 SERPL-SCNC: 23 MMOL/L — SIGNIFICANT CHANGE UP (ref 22–29)
CREAT SERPL-MCNC: 2.16 MG/DL — HIGH (ref 0.5–1.3)
GLUCOSE SERPL-MCNC: 87 MG/DL — SIGNIFICANT CHANGE UP (ref 70–115)
HCT VFR BLD CALC: 26.9 % — LOW (ref 42–52)
HGB BLD-MCNC: 8.5 G/DL — LOW (ref 14–18)
MAGNESIUM SERPL-MCNC: 2.4 MG/DL — SIGNIFICANT CHANGE UP (ref 1.8–2.5)
MCHC RBC-ENTMCNC: 30.4 PG — SIGNIFICANT CHANGE UP (ref 27–31)
MCHC RBC-ENTMCNC: 31.6 G/DL — LOW (ref 32–36)
MCV RBC AUTO: 96.1 FL — HIGH (ref 80–94)
PLATELET # BLD AUTO: 140 K/UL — LOW (ref 150–400)
POTASSIUM SERPL-MCNC: 4.6 MMOL/L — SIGNIFICANT CHANGE UP (ref 3.5–5.3)
POTASSIUM SERPL-SCNC: 4.6 MMOL/L — SIGNIFICANT CHANGE UP (ref 3.5–5.3)
RBC # BLD: 2.8 M/UL — LOW (ref 4.6–6.2)
RBC # FLD: 15.9 % — HIGH (ref 11–15.6)
SODIUM SERPL-SCNC: 139 MMOL/L — SIGNIFICANT CHANGE UP (ref 135–145)
WBC # BLD: 4.27 K/UL — LOW (ref 4.8–10.8)
WBC # FLD AUTO: 4.27 K/UL — LOW (ref 4.8–10.8)

## 2017-01-07 PROCEDURE — 99232 SBSQ HOSP IP/OBS MODERATE 35: CPT

## 2017-01-07 PROCEDURE — 93010 ELECTROCARDIOGRAM REPORT: CPT

## 2017-01-07 RX ORDER — SODIUM CHLORIDE 9 MG/ML
1000 INJECTION INTRAMUSCULAR; INTRAVENOUS; SUBCUTANEOUS
Qty: 0 | Refills: 0 | Status: DISCONTINUED | OUTPATIENT
Start: 2017-01-07 | End: 2017-01-08

## 2017-01-07 RX ADMIN — Medication 25 MILLIGRAM(S): at 13:02

## 2017-01-07 RX ADMIN — BUDESONIDE AND FORMOTEROL FUMARATE DIHYDRATE 1 PUFF(S): 160; 4.5 AEROSOL RESPIRATORY (INHALATION) at 21:31

## 2017-01-07 RX ADMIN — Medication 25 MILLIGRAM(S): at 21:15

## 2017-01-07 RX ADMIN — APIXABAN 2.5 MILLIGRAM(S): 2.5 TABLET, FILM COATED ORAL at 11:57

## 2017-01-07 RX ADMIN — ISOSORBIDE MONONITRATE 30 MILLIGRAM(S): 60 TABLET, EXTENDED RELEASE ORAL at 11:59

## 2017-01-07 RX ADMIN — ATORVASTATIN CALCIUM 20 MILLIGRAM(S): 80 TABLET, FILM COATED ORAL at 21:14

## 2017-01-07 RX ADMIN — SODIUM CHLORIDE 50 MILLILITER(S): 9 INJECTION INTRAMUSCULAR; INTRAVENOUS; SUBCUTANEOUS at 17:04

## 2017-01-07 RX ADMIN — APIXABAN 2.5 MILLIGRAM(S): 2.5 TABLET, FILM COATED ORAL at 21:14

## 2017-01-07 RX ADMIN — DONEPEZIL HYDROCHLORIDE 10 MILLIGRAM(S): 10 TABLET, FILM COATED ORAL at 21:15

## 2017-01-07 RX ADMIN — CITALOPRAM 20 MILLIGRAM(S): 10 TABLET, FILM COATED ORAL at 11:59

## 2017-01-07 RX ADMIN — BUDESONIDE AND FORMOTEROL FUMARATE DIHYDRATE 1 PUFF(S): 160; 4.5 AEROSOL RESPIRATORY (INHALATION) at 08:48

## 2017-01-07 RX ADMIN — Medication 5 MILLIGRAM(S): at 21:15

## 2017-01-07 RX ADMIN — Medication 325 MILLIGRAM(S): at 11:58

## 2017-01-07 RX ADMIN — PANTOPRAZOLE SODIUM 40 MILLIGRAM(S): 20 TABLET, DELAYED RELEASE ORAL at 12:00

## 2017-01-07 RX ADMIN — Medication 5 MILLIGRAM(S): at 12:00

## 2017-01-07 RX ADMIN — Medication 50 MILLIGRAM(S): at 12:00

## 2017-01-07 RX ADMIN — TAMSULOSIN HYDROCHLORIDE 0.4 MILLIGRAM(S): 0.4 CAPSULE ORAL at 21:15

## 2017-01-07 RX ADMIN — Medication 1 MILLIGRAM(S): at 11:58

## 2017-01-07 NOTE — PROGRESS NOTE ADULT - ASSESSMENT
Assessment and Plan  1. near syncope likely sec to dehydration sec to diuretics- hold diuretics. avoid nephrotoxics,gentle IVF started per nephrology.Diuretics on hold  2. CHf sys chr ef 20%- per family has aicd- per cards rate not an issue.   3. jaclyn on ckd 3- as above  4. Dm- is on ohgs at home. will do insulin here.  5. htn- controlled. cont home meds. except the above held ones and monitor  6. Pafib- on eliquis. since jaclyn on ckd, likely not a candidate for eliquis, need cards to address same. held for now.  7. ascites -s/p  IR paracentesis  8. Arrythmia on tele- dig toxicity ruled out with therapeutic levels,on eliquis    DVT prophyalx- on eliquis

## 2017-01-07 NOTE — PROGRESS NOTE ADULT - SUBJECTIVE AND OBJECTIVE BOX
NEPHROLOGY INTERVAL HPI/OVERNIGHT EVENTS:    MEDICATIONS  (STANDING):  tamsulosin 0.4milliGRAM(s) Oral at bedtime  isosorbide   mononitrate ER Tablet (IMDUR) 30milliGRAM(s) Oral daily  citalopram 20milliGRAM(s) Oral daily  atorvastatin 20milliGRAM(s) Oral at bedtime  buDESOnide 160 MICROgram(s)/formoterol 4.5 MICROgram(s) Inhaler 1Puff(s) Inhalation two times a day  donepezil 10milliGRAM(s) Oral at bedtime  pantoprazole    Tablet 40milliGRAM(s) Oral before breakfast  oxybutynin 5milliGRAM(s) Oral three times a day  insulin lispro (HumaLOG) corrective regimen sliding scale  SubCutaneous three times a day before meals  dextrose 5%. 1000milliLiter(s) IV Continuous <Continuous>  dextrose 50% Injectable 12.5Gram(s) IV Push once  dextrose 50% Injectable 25Gram(s) IV Push once  dextrose 50% Injectable 25Gram(s) IV Push once  ferrous    sulfate 325milliGRAM(s) Oral daily  influenza   Vaccine 0.5milliLiter(s) IntraMuscular once  epoetin damon Injectable [id number](s) SubCutaneous every 7 days  folic acid 1milliGRAM(s) Oral daily  metoprolol succinate ER 50milliGRAM(s) Oral daily  hydrALAZINE 25milliGRAM(s) Oral three times a day  apixaban 2.5milliGRAM(s) Oral two times a day    MEDICATIONS  (PRN):  dextrose Gel 1Dose(s) Oral once PRN Blood Glucose LESS THAN 70 milliGRAM(s)/deciliter  glucagon  Injectable 1milliGRAM(s) IntraMuscular once PRN Glucose LESS THAN 70 milligrams/deciliter  ALBUTerol    90 MICROgram(s) HFA Inhaler 1Puff(s) Inhalation four times a day PRN Shortness of Breath and/or Wheezing      Allergies    No Known Allergies    Intolerances        Vital Signs Last 24 Hrs  T(C): 36.7, Max: 36.9 ( @ 18:36)  T(F): 98, Max: 98.4 ( @ 18:36)  HR: 68 (66 - 81)  BP: 130/70 (122/78 - 180/110)  BP(mean): --  RR: 18 (16 - 18)  SpO2: 95% (95% - 99%)  Daily     Daily Weight in k.4 (2017 05:25)    PHYSICAL EXAM:      LABS:                        8.5    4.27  )-----------( 140      ( 2017 06:15 )             26.9     2017 06:15    139    |  107    |  52.0   ----------------------------<  87     4.6     |  23.0   |  2.16     Ca    8.5        2017 06:15  Mg     2.4       2017 06:15          Magnesium, Serum: 2.4 mg/dL ( @ 06:15)          RADIOLOGY & ADDITIONAL TESTS: NEPHROLOGY INTERVAL HPI/OVERNIGHT EVENTS:    cardio evaluation noted   No SOB laying flat      MEDICATIONS  (STANDING):  tamsulosin 0.4milliGRAM(s) Oral at bedtime  isosorbide   mononitrate ER Tablet (IMDUR) 30milliGRAM(s) Oral daily  citalopram 20milliGRAM(s) Oral daily  atorvastatin 20milliGRAM(s) Oral at bedtime  buDESOnide 160 MICROgram(s)/formoterol 4.5 MICROgram(s) Inhaler 1Puff(s) Inhalation two times a day  donepezil 10milliGRAM(s) Oral at bedtime  pantoprazole    Tablet 40milliGRAM(s) Oral before breakfast  oxybutynin 5milliGRAM(s) Oral three times a day  insulin lispro (HumaLOG) corrective regimen sliding scale  SubCutaneous three times a day before meals  dextrose 5%. 1000milliLiter(s) IV Continuous <Continuous>  dextrose 50% Injectable 12.5Gram(s) IV Push once  dextrose 50% Injectable 25Gram(s) IV Push once  dextrose 50% Injectable 25Gram(s) IV Push once  ferrous    sulfate 325milliGRAM(s) Oral daily  influenza   Vaccine 0.5milliLiter(s) IntraMuscular once  epoetin damon Injectable [id number](s) SubCutaneous every 7 days  folic acid 1milliGRAM(s) Oral daily  metoprolol succinate ER 50milliGRAM(s) Oral daily  hydrALAZINE 25milliGRAM(s) Oral three times a day  apixaban 2.5milliGRAM(s) Oral two times a day    MEDICATIONS  (PRN):  dextrose Gel 1Dose(s) Oral once PRN Blood Glucose LESS THAN 70 milliGRAM(s)/deciliter  glucagon  Injectable 1milliGRAM(s) IntraMuscular once PRN Glucose LESS THAN 70 milligrams/deciliter  ALBUTerol    90 MICROgram(s) HFA Inhaler 1Puff(s) Inhalation four times a day PRN Shortness of Breath and/or Wheezing      Allergies    No Known Allergies    Intolerances        Vital Signs Last 24 Hrs  T(C): 36.7, Max: 36.9 ( @ 18:36)  T(F): 98, Max: 98.4 ( @ 18:36)  HR: 68 (66 - 81)  BP: 130/70 (122/78 - 180/110)  BP(mean): --  RR: 18 (16 - 18)  SpO2: 95% (95% - 99%)  Daily     Daily Weight in k.4 (2017 05:25)    PHYSICAL EXAM:  HEAD:  Atraumatic, No edema   EYES: EOMI, PERRLA, conjunctiva and sclera clear  ENMT: Dry mucous membranes   NECK: Supple, No JVD  CHEST/LUNG: EAE , no wheeze  HEART: No rub, + rocky   ABDOMEN: Distended , soft . No rigidity or rebound   EXT ++ edema       LABS:                        8.5    4.27  )-----------( 140      ( 2017 06:15 )             26.9     2017 06:15    139    |  107    |  52.0   ----------------------------<  87     4.6     |  23.0   |  2.16     Ca    8.5        2017 06:15  Mg     2.4       2017 06:15          Magnesium, Serum: 2.4 mg/dL ( @ 06:15)          RADIOLOGY & ADDITIONAL TESTS:

## 2017-01-07 NOTE — PROGRESS NOTE ADULT - ASSESSMENT
HAZEL on CKD III  continue to hold diuretics Cr trending down  - gentle IVF and watch for signs of CHF  - NO hydro , + ascites on ultrasound  ACE , lasix and digoxin on hold  paracentesis done yest  cardiology follow up noted

## 2017-01-07 NOTE — PROGRESS NOTE ADULT - SUBJECTIVE AND OBJECTIVE BOX
CHIEF COMPLAINT/INTERVAL HISTORY:    Patient is a 76y old  Male who presents with a chief complaint of weakness. near syncope (05 Jan 2017 09:36)      HPI:  pt presents with- he went to work yesterday and then called his wife to say eh was feeling weak. when he drove back home he couldnt get out of the car and just sat down by the car, and his wife with neighbors helped him up and called 911. he has had his doctors appt with cards 3 days ago- was told all Ok. urology f/u informed him that he had ascites. denies any other associated symptoms  PMH- CHF- ef 20% has aicd,DM htn, pafib on eliquis, bph, ? gib  sh- lives with wife, denies habits (05 Jan 2017 09:36)      SUBJECTIVE & OBJECTIVE: Pt seen and examined at bedside. ,more comfortable s/p paracentesis says "I want to go home". denies any dyspnea, chest or abdo pain ,fevr or chills    ICU Vital Signs Last 24 Hrs  T(C): 36.7, Max: 36.7 (01-07 @ 12:08)  T(F): 98, Max: 98 (01-07 @ 12:08)  HR: 68 (68 - 80)  BP: 100/64 (100/64 - 140/82)  BP(mean): --  ABP: --  ABP(mean): --  RR: 18 (18 - 18)  SpO2: 99% (95% - 99%)        MEDICATIONS  (STANDING):  tamsulosin 0.4milliGRAM(s) Oral at bedtime  isosorbide   mononitrate ER Tablet (IMDUR) 30milliGRAM(s) Oral daily  citalopram 20milliGRAM(s) Oral daily  atorvastatin 20milliGRAM(s) Oral at bedtime  buDESOnide 160 MICROgram(s)/formoterol 4.5 MICROgram(s) Inhaler 1Puff(s) Inhalation two times a day  donepezil 10milliGRAM(s) Oral at bedtime  pantoprazole    Tablet 40milliGRAM(s) Oral before breakfast  oxybutynin 5milliGRAM(s) Oral three times a day  insulin lispro (HumaLOG) corrective regimen sliding scale  SubCutaneous three times a day before meals  dextrose 5%. 1000milliLiter(s) IV Continuous <Continuous>  dextrose 50% Injectable 12.5Gram(s) IV Push once  dextrose 50% Injectable 25Gram(s) IV Push once  dextrose 50% Injectable 25Gram(s) IV Push once  ferrous    sulfate 325milliGRAM(s) Oral daily  influenza   Vaccine 0.5milliLiter(s) IntraMuscular once  epoetin damon Injectable [id number](s) SubCutaneous every 7 days  folic acid 1milliGRAM(s) Oral daily  metoprolol succinate ER 50milliGRAM(s) Oral daily  hydrALAZINE 25milliGRAM(s) Oral three times a day  apixaban 2.5milliGRAM(s) Oral two times a day  sodium chloride 0.9%. 1000milliLiter(s) IV Continuous <Continuous>    MEDICATIONS  (PRN):  dextrose Gel 1Dose(s) Oral once PRN Blood Glucose LESS THAN 70 milliGRAM(s)/deciliter  glucagon  Injectable 1milliGRAM(s) IntraMuscular once PRN Glucose LESS THAN 70 milligrams/deciliter  ALBUTerol    90 MICROgram(s) HFA Inhaler 1Puff(s) Inhalation four times a day PRN Shortness of Breath and/or Wheezing        PHYSICAL EXAM:    GENERAL: NAD, well-groomed, well-developed  HEAD:  Atraumatic, Normocephalic  EYES: EOMI, PERRLA, conjunctiva and sclera clear  ENMT: Moist mucous membranes  NECK: Supple, No JVD  NERVOUS SYSTEM:  Alert & Oriented X3, Motor Strength 5/5 B/L upper and lower extremities; DTRs 2+ intact and symmetric  CHEST/LUNG: Clear to auscultation bilaterally; No rales, rhonchi, wheezing, or rubs  HEART: Regular rate and rhythm; No murmurs, rubs, or gallops  ABDOMEN: Soft, Nontender, Nondistended; Bowel sounds present  EXTREMITIES:  2+ Peripheral Pulses, No clubbing, cyanosis, or edema    LABS:                        8.5    4.27  )-----------( 140      ( 07 Jan 2017 06:15 )             26.9     07 Jan 2017 06:15    139    |  107    |  52.0   ----------------------------<  87     4.6     |  23.0   |  2.16     Ca    8.5        07 Jan 2017 06:15  Mg     2.4       07 Jan 2017 06:15            CAPILLARY BLOOD GLUCOSE  105 (07 Jan 2017 17:14)  95 (07 Jan 2017 12:08)  85 (07 Jan 2017 08:24)  109 (06 Jan 2017 21:25)      RECENT CULTURES:  Peritoneal fluid c/s-NG so far      RADIOLOGY & ADDITIONAL TESTS:

## 2017-01-08 LAB
ANION GAP SERPL CALC-SCNC: 7 MMOL/L — SIGNIFICANT CHANGE UP (ref 5–17)
BUN SERPL-MCNC: 38 MG/DL — HIGH (ref 8–20)
CALCIUM SERPL-MCNC: 8.2 MG/DL — LOW (ref 8.6–10.2)
CHLORIDE SERPL-SCNC: 106 MMOL/L — SIGNIFICANT CHANGE UP (ref 98–107)
CO2 SERPL-SCNC: 24 MMOL/L — SIGNIFICANT CHANGE UP (ref 22–29)
CREAT SERPL-MCNC: 1.82 MG/DL — HIGH (ref 0.5–1.3)
GLUCOSE SERPL-MCNC: 116 MG/DL — HIGH (ref 70–115)
INTERPRETATION SERPL IFE-IMP: SIGNIFICANT CHANGE UP
LDH SERPL L TO P-CCNC: 104 U/L — SIGNIFICANT CHANGE UP
NIGHT BLUE STAIN TISS: SIGNIFICANT CHANGE UP
POTASSIUM SERPL-MCNC: 4.8 MMOL/L — SIGNIFICANT CHANGE UP (ref 3.5–5.3)
POTASSIUM SERPL-SCNC: 4.8 MMOL/L — SIGNIFICANT CHANGE UP (ref 3.5–5.3)
SODIUM SERPL-SCNC: 137 MMOL/L — SIGNIFICANT CHANGE UP (ref 135–145)
SPECIMEN SOURCE: SIGNIFICANT CHANGE UP

## 2017-01-08 PROCEDURE — 99232 SBSQ HOSP IP/OBS MODERATE 35: CPT

## 2017-01-08 RX ADMIN — ISOSORBIDE MONONITRATE 30 MILLIGRAM(S): 60 TABLET, EXTENDED RELEASE ORAL at 12:25

## 2017-01-08 RX ADMIN — APIXABAN 2.5 MILLIGRAM(S): 2.5 TABLET, FILM COATED ORAL at 10:48

## 2017-01-08 RX ADMIN — Medication 20 MILLIGRAM(S): at 17:25

## 2017-01-08 RX ADMIN — Medication 5 MILLIGRAM(S): at 05:37

## 2017-01-08 RX ADMIN — Medication 25 MILLIGRAM(S): at 05:37

## 2017-01-08 RX ADMIN — BUDESONIDE AND FORMOTEROL FUMARATE DIHYDRATE 1 PUFF(S): 160; 4.5 AEROSOL RESPIRATORY (INHALATION) at 09:08

## 2017-01-08 RX ADMIN — APIXABAN 2.5 MILLIGRAM(S): 2.5 TABLET, FILM COATED ORAL at 21:34

## 2017-01-08 RX ADMIN — Medication 5 MILLIGRAM(S): at 13:14

## 2017-01-08 RX ADMIN — TAMSULOSIN HYDROCHLORIDE 0.4 MILLIGRAM(S): 0.4 CAPSULE ORAL at 21:34

## 2017-01-08 RX ADMIN — CITALOPRAM 20 MILLIGRAM(S): 10 TABLET, FILM COATED ORAL at 12:25

## 2017-01-08 RX ADMIN — Medication 325 MILLIGRAM(S): at 12:25

## 2017-01-08 RX ADMIN — Medication 25 MILLIGRAM(S): at 13:14

## 2017-01-08 RX ADMIN — ATORVASTATIN CALCIUM 20 MILLIGRAM(S): 80 TABLET, FILM COATED ORAL at 21:34

## 2017-01-08 RX ADMIN — Medication 5 MILLIGRAM(S): at 21:34

## 2017-01-08 RX ADMIN — Medication 50 MILLIGRAM(S): at 05:37

## 2017-01-08 RX ADMIN — Medication 1 MILLIGRAM(S): at 12:25

## 2017-01-08 RX ADMIN — BUDESONIDE AND FORMOTEROL FUMARATE DIHYDRATE 1 PUFF(S): 160; 4.5 AEROSOL RESPIRATORY (INHALATION) at 20:42

## 2017-01-08 RX ADMIN — PANTOPRAZOLE SODIUM 40 MILLIGRAM(S): 20 TABLET, DELAYED RELEASE ORAL at 05:37

## 2017-01-08 RX ADMIN — Medication 25 MILLIGRAM(S): at 21:34

## 2017-01-08 RX ADMIN — DONEPEZIL HYDROCHLORIDE 10 MILLIGRAM(S): 10 TABLET, FILM COATED ORAL at 21:34

## 2017-01-08 NOTE — PROGRESS NOTE ADULT - SUBJECTIVE AND OBJECTIVE BOX
CHIEF COMPLAINT: Patient is a 76y old  Male who presents with a chief complaint of weakness. near syncope  Seen today with HFrEF, RV failure and afib.     INTERVAL HISTORY:  s/p large volume paracentesis yesterday.     Allergies:  No Known Allergies    MEDICATIONS:  tamsulosin 0.4milliGRAM(s) Oral at bedtime  isosorbide   mononitrate ER Tablet (IMDUR) 30milliGRAM(s) Oral daily  metoprolol succinate ER 50milliGRAM(s) Oral daily  hydrALAZINE 25milliGRAM(s) Oral three times a day  buDESOnide 160 MICROgram(s)/formoterol 4.5 MICROgram(s) Inhaler 1Puff(s) Inhalation two times a day  ALBUTerol    90 MICROgram(s) HFA Inhaler 1Puff(s) Inhalation four times a day PRN  citalopram 20milliGRAM(s) Oral daily  donepezil 10milliGRAM(s) Oral at bedtime  pantoprazole    Tablet 40milliGRAM(s) Oral before breakfast  atorvastatin 20milliGRAM(s) Oral at bedtime  insulin lispro (HumaLOG) corrective regimen sliding scale  SubCutaneous three times a day before meals  dextrose Gel 1Dose(s) Oral once PRN  dextrose 50% Injectable 12.5Gram(s) IV Push once  dextrose 50% Injectable 25Gram(s) IV Push once  dextrose 50% Injectable 25Gram(s) IV Push once  glucagon  Injectable 1milliGRAM(s) IntraMuscular once PRN  oxybutynin 5milliGRAM(s) Oral three times a day  dextrose 5%. 1000milliLiter(s) IV Continuous <Continuous>  ferrous    sulfate 325milliGRAM(s) Oral daily  influenza   Vaccine 0.5milliLiter(s) IntraMuscular once  epoetin damon Injectable [id number](s) SubCutaneous every 7 days  folic acid 1milliGRAM(s) Oral daily  apixaban 2.5milliGRAM(s) Oral two times a day  sodium chloride 0.9%. 1000milliLiter(s) IV Continuous <Continuous>      PHYSICAL EXAM:    T(C): 36.6, Max: 37.2 (01-07 @ 15:32)  HR: 61 (61 - 70)  BP: 100/56 (100/56 - 122/60)  RR: 13 (12 - 18)  SpO2: 98% (97% - 99%)    I/O -180  Daily     Daily Weight in k (2017 05:01)  Appearance: Normal	  HEENT:   NC/AT  Eye: Pink Conjunctiva  Lungs: b/l crackles  CVS: IRRR, 3/6 sm lsb  Pulses: 1-2 + b/l leg edema   Neuro: A&O x3    TELEMETRY: 	afib, rocky      LABS:	 	                       8.5    4.27  )-----------( 140      ( 2017 06:15 )             26.9     2017 06:15    139    |  107    |  52.0   ----------------------------<  87     4.6     |  23.0   |  2.16     Ca    8.5        2017 06:15  Mg     2.4       2017 06:15          ASSESSMENT/PLAN:

## 2017-01-08 NOTE — PROGRESS NOTE ADULT - SUBJECTIVE AND OBJECTIVE BOX
CHIEF COMPLAINT/INTERVAL HISTORY:    Patient is a 76y old  Male who presents with a chief complaint of weakness. near syncope (05 Jan 2017 09:36)      HPI:  pt presents with- he went to work yesterday and then called his wife to say eh was feeling weak. when he drove back home he couldnt get out of the car and just sat down by the car, and his wife with neighbors helped him up and called 911. he has had his doctors appt with cards 3 days ago- was told all Ok. urology f/u informed him that he had ascites. denies any other associated symptoms  PMH- CHF- ef 20% has aicd,DM htn, pafib on eliquis, bph, ? gib  sh- lives with wife, denies habits (05 Jan 2017 09:36)      SUBJECTIVE & OBJECTIVE: Pt seen and examined at bedside. ,more comfortable s/p paracentesis says "I want to go home". denies any dyspnea, chest or abdo pain ,fevr or chills    ICU Vital Signs Last 24 Hrs  VS -stable,afebrile        MEDICATIONS  (STANDING):  tamsulosin 0.4milliGRAM(s) Oral at bedtime  isosorbide   mononitrate ER Tablet (IMDUR) 30milliGRAM(s) Oral daily  citalopram 20milliGRAM(s) Oral daily  atorvastatin 20milliGRAM(s) Oral at bedtime  buDESOnide 160 MICROgram(s)/formoterol 4.5 MICROgram(s) Inhaler 1Puff(s) Inhalation two times a day  donepezil 10milliGRAM(s) Oral at bedtime  pantoprazole    Tablet 40milliGRAM(s) Oral before breakfast  oxybutynin 5milliGRAM(s) Oral three times a day  insulin lispro (HumaLOG) corrective regimen sliding scale  SubCutaneous three times a day before meals  dextrose 5%. 1000milliLiter(s) IV Continuous <Continuous>  dextrose 50% Injectable 12.5Gram(s) IV Push once  dextrose 50% Injectable 25Gram(s) IV Push once  dextrose 50% Injectable 25Gram(s) IV Push once  ferrous    sulfate 325milliGRAM(s) Oral daily  influenza   Vaccine 0.5milliLiter(s) IntraMuscular once  epoetin damon Injectable [id number](s) SubCutaneous every 7 days  folic acid 1milliGRAM(s) Oral daily  metoprolol succinate ER 50milliGRAM(s) Oral daily  hydrALAZINE 25milliGRAM(s) Oral three times a day  apixaban 2.5milliGRAM(s) Oral two times a day  sodium chloride 0.9%. 1000milliLiter(s) IV Continuous <Continuous>    MEDICATIONS  (PRN):  dextrose Gel 1Dose(s) Oral once PRN Blood Glucose LESS THAN 70 milliGRAM(s)/deciliter  glucagon  Injectable 1milliGRAM(s) IntraMuscular once PRN Glucose LESS THAN 70 milligrams/deciliter  ALBUTerol    90 MICROgram(s) HFA Inhaler 1Puff(s) Inhalation four times a day PRN Shortness of Breath and/or Wheezing        PHYSICAL EXAM:    GENERAL: NAD, well-groomed, well-developed  HEAD:  Atraumatic, Normocephalic  EYES: EOMI, PERRLA, conjunctiva and sclera clear  ENMT: Moist mucous membranes  NECK: Supple, No JVD  NERVOUS SYSTEM:  Alert & Oriented X3, Motor Strength 5/5 B/L upper and lower extremities; DTRs 2+ intact and symmetric  CHEST/LUNG: Clear to auscultation bilaterally; No rales, rhonchi, wheezing, or rubs  HEART: Regular rate and rhythm; No murmurs, rubs, or gallops  ABDOMEN: Soft, Nontender, Nondistended; Bowel sounds present  EXTREMITIES:  2+ Peripheral Pulses, No clubbing, cyanosis, or edema    LABS:                        8.5    4.27  )-----------( 140      ( 07 Jan 2017 06:15 )             26.9     Cr 2.15-->1.82      CAPILLARY BLOOD GLUCOSE  105 (07 Jan 2017 17:14)  95 (07 Jan 2017 12:08)  85 (07 Jan 2017 08:24)  109 (06 Jan 2017 21:25)      RECENT CULTURES:  Peritoneal fluid c/s-NG so far      RADIOLOGY & ADDITIONAL TESTS:

## 2017-01-08 NOTE — PROGRESS NOTE ADULT - ASSESSMENT
Assessment and Plan  1. near syncope likely sec to dehydration sec to diuretics- hold diuretics. avoid nephrotoxics,gentle IVF started per nephrology. Diuretics on hold  Nephro on board  2. CHf sys chr ef 20%- per family has aicd- per cards rate not an issue.   3. jaclyn on ckd 3- Cr slwoly imprpovgn,gave 500 cc fluids yeserday.Diuretics still on hold,check BMP in AM  4. Dm- cont ISSC and accucheck  5. htn- controlled. cont home meds. except the above held ones and monitor  6. Pafib- on eliquis. since jaclyn on ckd, likely not a candidate for eliquis, need cards to address same. held for now.  7. ascites -s/p  IR paracentesis  8. Arrythmia on tele- dig toxicity ruled out with therapeutic levels,on eliquis    DVT prophyalx- on eliquis Assessment and Plan  1. near syncope likely sec to dehydration sec to diuretics- hold diuretics. avoid nephrotoxics,gentle IVF started per nephrology. Diuretics on hold  Nephro on board  2. CHf sys chr ef 20%- per family has aicd- per cards rate not an issue.   3. jaclyn on ckd 3- Cr slwoly imprpovgn,gave 500 cc fluids yeserday.Diuretics still on hold,check BMP in AM  4. Dm- cont ISSC and accucheck  5. htn- controlled. cont home meds. except the above held ones and monitor  6. Pafib- on eliquis. since jaclyn on ckd, likely not a candidate for eliquis, need cards to address same. held for now.  7. ascites -s/p  IR paracentesis  8. Arrythmia on tele- dig toxicity ruled out with therapeutic levels,on eliquis  D/C plan-PT eval requested, pt requiring assistance in moving around in room and usually is on bed, though he says he wants to go home and thinks he is stable enough to do his ADLs.He lvies with his wide   DVT prophyalx- on eliquis

## 2017-01-08 NOTE — PROGRESS NOTE ADULT - ASSESSMENT
HAZEL on CKD III  continue to hold diuretics Cr trending down 1.8 today  - gentle IVF and watch for signs of CHF  - NO hydro , + ascites on ultrasound  ACE , lasix and digoxin on hold  paracentesis done 2 days ago  cardiology follow up noted

## 2017-01-08 NOTE — PROGRESS NOTE ADULT - ASSESSMENT
77 yo with NICM with left and right sided HF, CARLOS A, CKD III with mostly right sided failure.   Pt is s/p large volume paracentesis  1. On BB. Hr in the 50's  2. Not able to take ACEI/ARB  3. Resume diurteic therapy, demadex 20 mg daily  4. Daily weight, i/os.  5. On AC with eliquis

## 2017-01-08 NOTE — PROGRESS NOTE ADULT - SUBJECTIVE AND OBJECTIVE BOX
NEPHROLOGY INTERVAL HPI/OVERNIGHT EVENTS:    Examined earlier  Looks comfortable    MEDICATIONS  (STANDING):  tamsulosin 0.4milliGRAM(s) Oral at bedtime  isosorbide   mononitrate ER Tablet (IMDUR) 30milliGRAM(s) Oral daily  citalopram 20milliGRAM(s) Oral daily  atorvastatin 20milliGRAM(s) Oral at bedtime  buDESOnide 160 MICROgram(s)/formoterol 4.5 MICROgram(s) Inhaler 1Puff(s) Inhalation two times a day  donepezil 10milliGRAM(s) Oral at bedtime  pantoprazole    Tablet 40milliGRAM(s) Oral before breakfast  oxybutynin 5milliGRAM(s) Oral three times a day  insulin lispro (HumaLOG) corrective regimen sliding scale  SubCutaneous three times a day before meals  dextrose 5%. 1000milliLiter(s) IV Continuous <Continuous>  dextrose 50% Injectable 12.5Gram(s) IV Push once  dextrose 50% Injectable 25Gram(s) IV Push once  dextrose 50% Injectable 25Gram(s) IV Push once  ferrous    sulfate 325milliGRAM(s) Oral daily  influenza   Vaccine 0.5milliLiter(s) IntraMuscular once  epoetin damon Injectable [id number](s) SubCutaneous every 7 days  folic acid 1milliGRAM(s) Oral daily  metoprolol succinate ER 50milliGRAM(s) Oral daily  hydrALAZINE 25milliGRAM(s) Oral three times a day  apixaban 2.5milliGRAM(s) Oral two times a day  torsemide 20milliGRAM(s) Oral daily    MEDICATIONS  (PRN):  dextrose Gel 1Dose(s) Oral once PRN Blood Glucose LESS THAN 70 milliGRAM(s)/deciliter  glucagon  Injectable 1milliGRAM(s) IntraMuscular once PRN Glucose LESS THAN 70 milligrams/deciliter  ALBUTerol    90 MICROgram(s) HFA Inhaler 1Puff(s) Inhalation four times a day PRN Shortness of Breath and/or Wheezing      Allergies    No Known Allergies    Intolerances        Vital Signs Last 24 Hrs  T(C): 36.6, Max: 37.2 ( @ 15:32)  T(F): 97.9, Max: 98.9 ( @ 15:32)  HR: 61 (61 - 70)  BP: 100/56 (100/56 - 122/60)  BP(mean): --  RR: 13 (12 - 18)  SpO2: 98% (97% - 99%)  Daily     Daily Weight in k (2017 05:01)    PHYSICAL EXAM:  HEAD:  Atraumatic, No edema   EYES: EOMI, PERRLA, conjunctiva and sclera clear  ENMT: Dry mucous membranes   NECK: Supple, No JVD  CHEST/LUNG: EAE , no wheeze  HEART: No rub, + rocky   ABDOMEN: Distended , soft . No rigidity or rebound   EXT ++ edema       LABS:                        8.5    4.27  )-----------( 140      ( 2017 06:15 )             26.9     2017 14:06    137    |  106    |  38.0   ----------------------------<  116    4.8     |  24.0   |  1.82     Ca    8.2        2017 14:06  Mg     2.4       2017 06:15                  RADIOLOGY & ADDITIONAL TESTS:

## 2017-01-09 DIAGNOSIS — E86.0 DEHYDRATION: ICD-10-CM

## 2017-01-09 DIAGNOSIS — E11.9 TYPE 2 DIABETES MELLITUS WITHOUT COMPLICATIONS: ICD-10-CM

## 2017-01-09 DIAGNOSIS — I48.0 PAROXYSMAL ATRIAL FIBRILLATION: ICD-10-CM

## 2017-01-09 DIAGNOSIS — I50.41 ACUTE COMBINED SYSTOLIC (CONGESTIVE) AND DIASTOLIC (CONGESTIVE) HEART FAILURE: ICD-10-CM

## 2017-01-09 LAB
ANION GAP SERPL CALC-SCNC: 9 MMOL/L — SIGNIFICANT CHANGE UP (ref 5–17)
BUN SERPL-MCNC: 36 MG/DL — HIGH (ref 8–20)
CALCIUM SERPL-MCNC: 8.1 MG/DL — LOW (ref 8.6–10.2)
CHLORIDE SERPL-SCNC: 106 MMOL/L — SIGNIFICANT CHANGE UP (ref 98–107)
CO2 SERPL-SCNC: 24 MMOL/L — SIGNIFICANT CHANGE UP (ref 22–29)
CREAT SERPL-MCNC: 1.67 MG/DL — HIGH (ref 0.5–1.3)
GLUCOSE SERPL-MCNC: 95 MG/DL — SIGNIFICANT CHANGE UP (ref 70–115)
HCT VFR BLD CALC: 24.5 % — LOW (ref 42–52)
HGB BLD-MCNC: 7.7 G/DL — LOW (ref 14–18)
MCHC RBC-ENTMCNC: 30.7 PG — SIGNIFICANT CHANGE UP (ref 27–31)
MCHC RBC-ENTMCNC: 31.4 G/DL — LOW (ref 32–36)
MCV RBC AUTO: 97.6 FL — HIGH (ref 80–94)
PLATELET # BLD AUTO: 156 K/UL — SIGNIFICANT CHANGE UP (ref 150–400)
POTASSIUM SERPL-MCNC: 4.5 MMOL/L — SIGNIFICANT CHANGE UP (ref 3.5–5.3)
POTASSIUM SERPL-SCNC: 4.5 MMOL/L — SIGNIFICANT CHANGE UP (ref 3.5–5.3)
RBC # BLD: 2.51 M/UL — LOW (ref 4.6–6.2)
RBC # FLD: 16.4 % — HIGH (ref 11–15.6)
SODIUM SERPL-SCNC: 139 MMOL/L — SIGNIFICANT CHANGE UP (ref 135–145)
WBC # BLD: 4.21 K/UL — LOW (ref 4.8–10.8)
WBC # FLD AUTO: 4.21 K/UL — LOW (ref 4.8–10.8)

## 2017-01-09 PROCEDURE — 99233 SBSQ HOSP IP/OBS HIGH 50: CPT

## 2017-01-09 RX ORDER — IRON SUCROSE 20 MG/ML
100 INJECTION, SOLUTION INTRAVENOUS DAILY
Qty: 0 | Refills: 0 | Status: DISCONTINUED | OUTPATIENT
Start: 2017-01-09 | End: 2017-01-12

## 2017-01-09 RX ADMIN — TAMSULOSIN HYDROCHLORIDE 0.4 MILLIGRAM(S): 0.4 CAPSULE ORAL at 21:11

## 2017-01-09 RX ADMIN — Medication 5 MILLIGRAM(S): at 21:12

## 2017-01-09 RX ADMIN — Medication 1 MILLIGRAM(S): at 12:06

## 2017-01-09 RX ADMIN — APIXABAN 2.5 MILLIGRAM(S): 2.5 TABLET, FILM COATED ORAL at 21:11

## 2017-01-09 RX ADMIN — IRON SUCROSE 210 MILLIGRAM(S): 20 INJECTION, SOLUTION INTRAVENOUS at 17:29

## 2017-01-09 RX ADMIN — BUDESONIDE AND FORMOTEROL FUMARATE DIHYDRATE 1 PUFF(S): 160; 4.5 AEROSOL RESPIRATORY (INHALATION) at 09:02

## 2017-01-09 RX ADMIN — Medication 25 MILLIGRAM(S): at 05:36

## 2017-01-09 RX ADMIN — CITALOPRAM 20 MILLIGRAM(S): 10 TABLET, FILM COATED ORAL at 12:06

## 2017-01-09 RX ADMIN — Medication 325 MILLIGRAM(S): at 12:06

## 2017-01-09 RX ADMIN — Medication 20 MILLIGRAM(S): at 05:36

## 2017-01-09 RX ADMIN — BUDESONIDE AND FORMOTEROL FUMARATE DIHYDRATE 1 PUFF(S): 160; 4.5 AEROSOL RESPIRATORY (INHALATION) at 21:37

## 2017-01-09 RX ADMIN — Medication 5 MILLIGRAM(S): at 14:22

## 2017-01-09 RX ADMIN — DONEPEZIL HYDROCHLORIDE 10 MILLIGRAM(S): 10 TABLET, FILM COATED ORAL at 21:12

## 2017-01-09 RX ADMIN — PANTOPRAZOLE SODIUM 40 MILLIGRAM(S): 20 TABLET, DELAYED RELEASE ORAL at 05:37

## 2017-01-09 RX ADMIN — Medication 5 MILLIGRAM(S): at 05:36

## 2017-01-09 RX ADMIN — APIXABAN 2.5 MILLIGRAM(S): 2.5 TABLET, FILM COATED ORAL at 12:06

## 2017-01-09 RX ADMIN — Medication 50 MILLIGRAM(S): at 05:36

## 2017-01-09 RX ADMIN — Medication 25 MILLIGRAM(S): at 14:22

## 2017-01-09 RX ADMIN — Medication 25 MILLIGRAM(S): at 21:12

## 2017-01-09 RX ADMIN — ISOSORBIDE MONONITRATE 30 MILLIGRAM(S): 60 TABLET, EXTENDED RELEASE ORAL at 12:06

## 2017-01-09 RX ADMIN — ATORVASTATIN CALCIUM 20 MILLIGRAM(S): 80 TABLET, FILM COATED ORAL at 21:12

## 2017-01-09 NOTE — PHYSICAL THERAPY INITIAL EVALUATION ADULT - PLANNED THERAPY INTERVENTIONS, PT EVAL
transfer training/postural re-education/gait training/balance training/bed mobility training/strengthening

## 2017-01-09 NOTE — PHYSICAL THERAPY INITIAL EVALUATION ADULT - ADDITIONAL COMMENTS
Pt lives in a private home with his wife. 2 steps to enter without handrails, 4+7 steps inside with handrails. Pt was independent PTA with [employer]. Pt does not own other DME

## 2017-01-09 NOTE — PROGRESS NOTE ADULT - ASSESSMENT
75y/o male with PMH HTN, PAF on Eliquis, BPH, NICM EF 20%, right sided HF, AICD, DM presented with feeling weak, with difficulty ambulating. Patient found to have dehydration and diuretics initially discontinued.  Patient found to have ascites and underwent paracentesis with 13Liters removed.  Patient restarted on Demadex.  ARF improving slowly.

## 2017-01-09 NOTE — PROGRESS NOTE ADULT - SUBJECTIVE AND OBJECTIVE BOX
NEPHROLOGY INTERVAL HPI/OVERNIGHT EVENTS:    Examined earlier  Looks comfortable    MEDICATIONS  (STANDING):  tamsulosin 0.4milliGRAM(s) Oral at bedtime  isosorbide   mononitrate ER Tablet (IMDUR) 30milliGRAM(s) Oral daily  citalopram 20milliGRAM(s) Oral daily  atorvastatin 20milliGRAM(s) Oral at bedtime  buDESOnide 160 MICROgram(s)/formoterol 4.5 MICROgram(s) Inhaler 1Puff(s) Inhalation two times a day  donepezil 10milliGRAM(s) Oral at bedtime  pantoprazole    Tablet 40milliGRAM(s) Oral before breakfast  oxybutynin 5milliGRAM(s) Oral three times a day  insulin lispro (HumaLOG) corrective regimen sliding scale  SubCutaneous three times a day before meals  dextrose 5%. 1000milliLiter(s) IV Continuous <Continuous>  dextrose 50% Injectable 12.5Gram(s) IV Push once  dextrose 50% Injectable 25Gram(s) IV Push once  dextrose 50% Injectable 25Gram(s) IV Push once  ferrous    sulfate 325milliGRAM(s) Oral daily  influenza   Vaccine 0.5milliLiter(s) IntraMuscular once  epoetin damon Injectable [id number](s) SubCutaneous every 7 days  folic acid 1milliGRAM(s) Oral daily  metoprolol succinate ER 50milliGRAM(s) Oral daily  hydrALAZINE 25milliGRAM(s) Oral three times a day  apixaban 2.5milliGRAM(s) Oral two times a day  torsemide 20milliGRAM(s) Oral daily  iron sucrose IVPB 100milliGRAM(s) IV Intermittent daily    MEDICATIONS  (PRN):  dextrose Gel 1Dose(s) Oral once PRN Blood Glucose LESS THAN 70 milliGRAM(s)/deciliter  glucagon  Injectable 1milliGRAM(s) IntraMuscular once PRN Glucose LESS THAN 70 milligrams/deciliter  ALBUTerol    90 MICROgram(s) HFA Inhaler 1Puff(s) Inhalation four times a day PRN Shortness of Breath and/or Wheezing      Allergies    No Known Allergies    Intolerances        Vital Signs Last 24 Hrs  T(C): 36.7, Max: 37.2 (01-09 @ 05:00)  T(F): 98.1, Max: 98.9 (01-09 @ 05:00)  HR: 63 (60 - 73)  BP: 120/60 (110/56 - 128/80)  BP(mean): --  RR: 18 (11 - 18)  SpO2: 98% (92% - 100%)  Daily     Daily     PHYSICAL EXAM:  HEAD:  Atraumatic, No edema   EYES: EOMI, PERRLA, conjunctiva and sclera clear  ENMT: Dry mucous membranes   NECK: Supple, No JVD  CHEST/LUNG: EAE , no wheeze  HEART: No rub, + rocky   ABDOMEN: Distended , soft . No rigidity or rebound   EXT ++ edema       LABS:                        7.7    4.21  )-----------( 156      ( 09 Jan 2017 05:46 )             24.5     09 Jan 2017 05:10    139    |  106    |  36.0   ----------------------------<  95     4.5     |  24.0   |  1.67     Ca    8.1        09 Jan 2017 05:10                  RADIOLOGY & ADDITIONAL TESTS:

## 2017-01-09 NOTE — PROGRESS NOTE ADULT - SUBJECTIVE AND OBJECTIVE BOX
CC: weakness. near syncope     HPI:  77y/o male with PMH HTN, PAF on Eliquis, BPH, NICM EF 20%, right sided HF, AICD, DM presented with feeling weak, with difficulty ambulating. Patient found to have dehydration and diuretics initially discontinued.  Patient found to have ascites and underwent paracentesis with 13Liters removed.  Patient restarted on Demadex.  ARF improving slowly.    INTERVAL HPI/OVERNIGHT EVENTS: Patient denies any SOB, chest pain, Abdominal pain.  Patient states that he has been urinating a lot. "Feeling better."    Vital Signs Last 24 Hrs  T(C): 36.6, Max: 37.2 (01-09 @ 05:00)  T(F): 97.8, Max: 98.9 (01-09 @ 05:00)  HR: 73 (59 - 73)  BP: 126/68 (122/58 - 128/80)  BP(mean): --  RR: 13 (11 - 14)  SpO2: 99% (96% - 100%)  I&O's Detail    PHYSICAL EXAM:  GENERAL: NAD, well-groomed, well-developed  HEAD:  Atraumatic, Normocephalic  EYES: EOMI, PERRLA, conjunctiva and sclera clear  NECK: Supple, No JVD, Normal thyroid  NERVOUS SYSTEM:  Alert, Good concentration; Motor Strength 5/5 B/L upper and lower extremities  CHEST/LUNG: Bibasilar crackles; No rhonchi, wheezing, or rubs  HEART: Regular rate and rhythm; No murmurs, rubs, or gallops  ABDOMEN: Soft, Nontender, mild distention; Bowel sounds present  EXTREMITIES:  2+ Peripheral Pulses, No clubbing or cyanosis; (+) Bilateral LE edema  LYMPH: No lymphadenopathy noted  SKIN: No rashes or lesions                          7.7    4.21  )-----------( 156      ( 09 Jan 2017 05:46 )             24.5     09 Jan 2017 05:10    139    |  106    |  36.0   ----------------------------<  95     4.5     |  24.0   |  1.67     Ca    8.1        09 Jan 2017 05:10        CAPILLARY BLOOD GLUCOSE  133 (09 Jan 2017 11:59)  90 (09 Jan 2017 09:12)  123 (08 Jan 2017 17:22)        Hemoglobin A1C, Whole Blood: 5.2 % (01-06 @ 08:53)    MEDICATIONS  (STANDING):  tamsulosin 0.4milliGRAM(s) Oral at bedtime  isosorbide   mononitrate ER Tablet (IMDUR) 30milliGRAM(s) Oral daily  citalopram 20milliGRAM(s) Oral daily  atorvastatin 20milliGRAM(s) Oral at bedtime  buDESOnide 160 MICROgram(s)/formoterol 4.5 MICROgram(s) Inhaler 1Puff(s) Inhalation two times a day  donepezil 10milliGRAM(s) Oral at bedtime  pantoprazole    Tablet 40milliGRAM(s) Oral before breakfast  oxybutynin 5milliGRAM(s) Oral three times a day  insulin lispro (HumaLOG) corrective regimen sliding scale  SubCutaneous three times a day before meals  dextrose 5%. 1000milliLiter(s) IV Continuous <Continuous>  dextrose 50% Injectable 12.5Gram(s) IV Push once  dextrose 50% Injectable 25Gram(s) IV Push once  dextrose 50% Injectable 25Gram(s) IV Push once  ferrous    sulfate 325milliGRAM(s) Oral daily  influenza   Vaccine 0.5milliLiter(s) IntraMuscular once  epoetin damon Injectable 38728Nhkg(s) SubCutaneous every 7 days  folic acid 1milliGRAM(s) Oral daily  metoprolol succinate ER 50milliGRAM(s) Oral daily  hydrALAZINE 25milliGRAM(s) Oral three times a day  apixaban 2.5milliGRAM(s) Oral two times a day  torsemide 20milliGRAM(s) Oral daily    MEDICATIONS  (PRN):  dextrose Gel 1Dose(s) Oral once PRN Blood Glucose LESS THAN 70 milliGRAM(s)/deciliter  glucagon  Injectable 1milliGRAM(s) IntraMuscular once PRN Glucose LESS THAN 70 milligrams/deciliter  ALBUTerol    90 MICROgram(s) HFA Inhaler 1Puff(s) Inhalation four times a day PRN Shortness of Breath and/or Wheezing CC: weakness. near syncope     HPI:  75y/o male with PMH HTN, PAF on Eliquis, BPH, NICM EF 20%, right sided HF, AICD, DM presented with feeling weak, with difficulty ambulating. Patient found to have dehydration and diuretics initially discontinued.  Patient found to have ascites and underwent paracentesis with 13Liters removed.  Patient restarted on Demadex.     INTERVAL HPI/OVERNIGHT EVENTS: Patient denies any SOB, chest pain, Abdominal pain.  Patient states that he has been urinating a lot. "Feeling better."      Vital Signs Last 24 Hrs  T(C): 36.6, Max: 37.2 (01-09 @ 05:00)  T(F): 97.8, Max: 98.9 (01-09 @ 05:00)  HR: 73 (59 - 73)  BP: 126/68 (122/58 - 128/80)  BP(mean): --  RR: 13 (11 - 14)  SpO2: 99% (96% - 100%)  I&O's Detail    PHYSICAL EXAM:  GENERAL: NAD, well-groomed, well-developed  HEAD:  Atraumatic, Normocephalic  EYES: EOMI, PERRLA, conjunctiva and sclera clear  NECK: Supple, No JVD, Normal thyroid  NERVOUS SYSTEM:  Alert, Good concentration; Motor Strength 5/5 B/L upper and lower extremities  CHEST/LUNG: Bibasilar crackles; No rhonchi, wheezing, or rubs  HEART: Regular rate and rhythm; No murmurs, rubs, or gallops  ABDOMEN: Soft, Nontender, mild distention; Bowel sounds present  EXTREMITIES:  2+ Peripheral Pulses, No clubbing or cyanosis; (+) Bilateral LE edema                          7.7    4.21  )-----------( 156      ( 09 Jan 2017 05:46 )             24.5     09 Jan 2017 05:10    139    |  106    |  36.0   ----------------------------<  95     4.5     |  24.0   |  1.67     Ca    8.1        09 Jan 2017 05:10        CAPILLARY BLOOD GLUCOSE  133 (09 Jan 2017 11:59)  90 (09 Jan 2017 09:12)  123 (08 Jan 2017 17:22)        Hemoglobin A1C, Whole Blood: 5.2 % (01-06 @ 08:53)    MEDICATIONS  (STANDING):  tamsulosin 0.4milliGRAM(s) Oral at bedtime  isosorbide   mononitrate ER Tablet (IMDUR) 30milliGRAM(s) Oral daily  citalopram 20milliGRAM(s) Oral daily  atorvastatin 20milliGRAM(s) Oral at bedtime  buDESOnide 160 MICROgram(s)/formoterol 4.5 MICROgram(s) Inhaler 1Puff(s) Inhalation two times a day  donepezil 10milliGRAM(s) Oral at bedtime  pantoprazole    Tablet 40milliGRAM(s) Oral before breakfast  oxybutynin 5milliGRAM(s) Oral three times a day  insulin lispro (HumaLOG) corrective regimen sliding scale  SubCutaneous three times a day before meals  dextrose 5%. 1000milliLiter(s) IV Continuous <Continuous>  dextrose 50% Injectable 12.5Gram(s) IV Push once  dextrose 50% Injectable 25Gram(s) IV Push once  dextrose 50% Injectable 25Gram(s) IV Push once  ferrous    sulfate 325milliGRAM(s) Oral daily  influenza   Vaccine 0.5milliLiter(s) IntraMuscular once  epoetin damon Injectable [id number](s) SubCutaneous every 7 days  folic acid 1milliGRAM(s) Oral daily  metoprolol succinate ER 50milliGRAM(s) Oral daily  hydrALAZINE 25milliGRAM(s) Oral three times a day  apixaban 2.5milliGRAM(s) Oral two times a day  torsemide 20milliGRAM(s) Oral daily    MEDICATIONS  (PRN):  dextrose Gel 1Dose(s) Oral once PRN Blood Glucose LESS THAN 70 milliGRAM(s)/deciliter  glucagon  Injectable 1milliGRAM(s) IntraMuscular once PRN Glucose LESS THAN 70 milligrams/deciliter  ALBUTerol    90 MICROgram(s) HFA Inhaler 1Puff(s) Inhalation four times a day PRN Shortness of Breath and/or Wheezing

## 2017-01-09 NOTE — PROGRESS NOTE ADULT - ASSESSMENT
HAZEL on CKD  continue to hold diuretics Cr trending down 1.6 today  - improved w gentle IVF   - NO hydro , + ascites on ultrasound  ACE , lasix and digoxin on hold  paracentesis done 2 days ago  cardiology follow up noted

## 2017-01-09 NOTE — PHYSICAL THERAPY INITIAL EVALUATION ADULT - PERTINENT HX OF CURRENT PROBLEM, REHAB EVAL
pt experienced a near syncopal episode in his car, was able to drive home but unable to get out of car without assistance from his wife and neighbors. Pt came to ED by EMS, diagnosed with bradycardia (AICD interrogated), Acute CHF and HAZEL

## 2017-01-09 NOTE — PHYSICAL THERAPY INITIAL EVALUATION ADULT - IMPAIRMENTS FOUND, PT EVAL
muscle strength/posture/gait, locomotion, and balance/aerobic capacity/endurance/anthropometric characteristics

## 2017-01-10 LAB
ANION GAP SERPL CALC-SCNC: 9 MMOL/L — SIGNIFICANT CHANGE UP (ref 5–17)
BUN SERPL-MCNC: 33 MG/DL — HIGH (ref 8–20)
CALCIUM SERPL-MCNC: 8.2 MG/DL — LOW (ref 8.6–10.2)
CHLORIDE SERPL-SCNC: 105 MMOL/L — SIGNIFICANT CHANGE UP (ref 98–107)
CO2 SERPL-SCNC: 25 MMOL/L — SIGNIFICANT CHANGE UP (ref 22–29)
CREAT SERPL-MCNC: 1.57 MG/DL — HIGH (ref 0.5–1.3)
GLUCOSE SERPL-MCNC: 97 MG/DL — SIGNIFICANT CHANGE UP (ref 70–115)
HCT VFR BLD CALC: 25.8 % — LOW (ref 42–52)
HGB BLD-MCNC: 8.3 G/DL — LOW (ref 14–18)
MAGNESIUM SERPL-MCNC: 1.9 MG/DL — SIGNIFICANT CHANGE UP (ref 1.8–2.5)
MCHC RBC-ENTMCNC: 31.1 PG — HIGH (ref 27–31)
MCHC RBC-ENTMCNC: 32.2 G/DL — SIGNIFICANT CHANGE UP (ref 32–36)
MCV RBC AUTO: 96.6 FL — HIGH (ref 80–94)
PLATELET # BLD AUTO: 153 K/UL — SIGNIFICANT CHANGE UP (ref 150–400)
POTASSIUM SERPL-MCNC: 4 MMOL/L — SIGNIFICANT CHANGE UP (ref 3.5–5.3)
POTASSIUM SERPL-SCNC: 4 MMOL/L — SIGNIFICANT CHANGE UP (ref 3.5–5.3)
RBC # BLD: 2.67 M/UL — LOW (ref 4.6–6.2)
RBC # FLD: 16.2 % — HIGH (ref 11–15.6)
SODIUM SERPL-SCNC: 139 MMOL/L — SIGNIFICANT CHANGE UP (ref 135–145)
WBC # BLD: 4.89 K/UL — SIGNIFICANT CHANGE UP (ref 4.8–10.8)
WBC # FLD AUTO: 4.89 K/UL — SIGNIFICANT CHANGE UP (ref 4.8–10.8)

## 2017-01-10 PROCEDURE — 99233 SBSQ HOSP IP/OBS HIGH 50: CPT

## 2017-01-10 PROCEDURE — 93010 ELECTROCARDIOGRAM REPORT: CPT

## 2017-01-10 RX ORDER — RISPERIDONE 4 MG/1
0.5 TABLET ORAL AT BEDTIME
Qty: 0 | Refills: 0 | Status: DISCONTINUED | OUTPATIENT
Start: 2017-01-10 | End: 2017-01-12

## 2017-01-10 RX ADMIN — Medication 20 MILLIGRAM(S): at 06:31

## 2017-01-10 RX ADMIN — Medication 1 MILLIGRAM(S): at 13:30

## 2017-01-10 RX ADMIN — Medication 325 MILLIGRAM(S): at 13:28

## 2017-01-10 RX ADMIN — BUDESONIDE AND FORMOTEROL FUMARATE DIHYDRATE 1 PUFF(S): 160; 4.5 AEROSOL RESPIRATORY (INHALATION) at 08:49

## 2017-01-10 RX ADMIN — ATORVASTATIN CALCIUM 20 MILLIGRAM(S): 80 TABLET, FILM COATED ORAL at 21:12

## 2017-01-10 RX ADMIN — Medication 5 MILLIGRAM(S): at 06:46

## 2017-01-10 RX ADMIN — Medication 5 MILLIGRAM(S): at 21:12

## 2017-01-10 RX ADMIN — Medication 5 MILLIGRAM(S): at 15:01

## 2017-01-10 RX ADMIN — BUDESONIDE AND FORMOTEROL FUMARATE DIHYDRATE 1 PUFF(S): 160; 4.5 AEROSOL RESPIRATORY (INHALATION) at 21:13

## 2017-01-10 RX ADMIN — PANTOPRAZOLE SODIUM 40 MILLIGRAM(S): 20 TABLET, DELAYED RELEASE ORAL at 06:31

## 2017-01-10 RX ADMIN — Medication 25 MILLIGRAM(S): at 13:28

## 2017-01-10 RX ADMIN — TAMSULOSIN HYDROCHLORIDE 0.4 MILLIGRAM(S): 0.4 CAPSULE ORAL at 21:12

## 2017-01-10 RX ADMIN — RISPERIDONE 0.5 MILLIGRAM(S): 4 TABLET ORAL at 21:12

## 2017-01-10 RX ADMIN — APIXABAN 2.5 MILLIGRAM(S): 2.5 TABLET, FILM COATED ORAL at 13:28

## 2017-01-10 RX ADMIN — Medication 25 MILLIGRAM(S): at 21:12

## 2017-01-10 RX ADMIN — Medication 25 MILLIGRAM(S): at 06:31

## 2017-01-10 RX ADMIN — APIXABAN 2.5 MILLIGRAM(S): 2.5 TABLET, FILM COATED ORAL at 21:12

## 2017-01-10 RX ADMIN — Medication 50 MILLIGRAM(S): at 06:31

## 2017-01-10 RX ADMIN — IRON SUCROSE 210 MILLIGRAM(S): 20 INJECTION, SOLUTION INTRAVENOUS at 19:00

## 2017-01-10 RX ADMIN — ISOSORBIDE MONONITRATE 30 MILLIGRAM(S): 60 TABLET, EXTENDED RELEASE ORAL at 13:28

## 2017-01-10 RX ADMIN — CITALOPRAM 20 MILLIGRAM(S): 10 TABLET, FILM COATED ORAL at 13:27

## 2017-01-10 RX ADMIN — DONEPEZIL HYDROCHLORIDE 10 MILLIGRAM(S): 10 TABLET, FILM COATED ORAL at 21:12

## 2017-01-10 NOTE — PROGRESS NOTE ADULT - SUBJECTIVE AND OBJECTIVE BOX
NEPHROLOGY INTERVAL HPI/OVERNIGHT EVENTS:    Examined earlier  Looks comfortable      MEDICATIONS  (STANDING):  tamsulosin 0.4milliGRAM(s) Oral at bedtime  isosorbide   mononitrate ER Tablet (IMDUR) 30milliGRAM(s) Oral daily  citalopram 20milliGRAM(s) Oral daily  atorvastatin 20milliGRAM(s) Oral at bedtime  buDESOnide 160 MICROgram(s)/formoterol 4.5 MICROgram(s) Inhaler 1Puff(s) Inhalation two times a day  donepezil 10milliGRAM(s) Oral at bedtime  pantoprazole    Tablet 40milliGRAM(s) Oral before breakfast  oxybutynin 5milliGRAM(s) Oral three times a day  insulin lispro (HumaLOG) corrective regimen sliding scale  SubCutaneous three times a day before meals  dextrose 5%. 1000milliLiter(s) IV Continuous <Continuous>  dextrose 50% Injectable 12.5Gram(s) IV Push once  dextrose 50% Injectable 25Gram(s) IV Push once  dextrose 50% Injectable 25Gram(s) IV Push once  ferrous    sulfate 325milliGRAM(s) Oral daily  influenza   Vaccine 0.5milliLiter(s) IntraMuscular once  epoetin damon Injectable 86125Boad(s) SubCutaneous every 7 days  folic acid 1milliGRAM(s) Oral daily  metoprolol succinate ER 50milliGRAM(s) Oral daily  hydrALAZINE 25milliGRAM(s) Oral three times a day  apixaban 2.5milliGRAM(s) Oral two times a day  torsemide 20milliGRAM(s) Oral daily  iron sucrose IVPB 100milliGRAM(s) IV Intermittent daily  risperiDONE   Tablet 0.5milliGRAM(s) Oral at bedtime    MEDICATIONS  (PRN):  dextrose Gel 1Dose(s) Oral once PRN Blood Glucose LESS THAN 70 milliGRAM(s)/deciliter  glucagon  Injectable 1milliGRAM(s) IntraMuscular once PRN Glucose LESS THAN 70 milligrams/deciliter  ALBUTerol    90 MICROgram(s) HFA Inhaler 1Puff(s) Inhalation four times a day PRN Shortness of Breath and/or Wheezing      Allergies    No Known Allergies    Intolerances        Vital Signs Last 24 Hrs  T(C): 36.6, Max: 36.7 ( @ 20:57)  T(F): 97.9, Max: 98.1 ( @ 20:57)  HR: 58 (56 - 72)  BP: 126/60 (120/60 - 132/68)  BP(mean): 0 (0 - 0)  RR: 15 (15 - 18)  SpO2: 95% (93% - 98%)  Daily     Daily Weight in k.5 (10 Jeremi 2017 05:00)    PHYSICAL EXAM:  HEAD:  Atraumatic, No edema   EYES: EOMI, PERRLA, conjunctiva and sclera clear  ENMT: Dry mucous membranes   NECK: Supple, No JVD  CHEST/LUNG: EAE , no wheeze  HEART: No rub, + rocky   ABDOMEN: Distended , soft . No rigidity or rebound   EXT ++ edema           LABS:                        8.3    4.89  )-----------( 153      ( 10 Jeremi 2017 02:19 )             25.8     10 Jeremi 2017 02:19    139    |  105    |  33.0   ----------------------------<  97     4.0     |  25.0   |  1.57     Ca    8.2        10 Jeremi 2017 02:19  Mg     1.9       10 Jeremi 2017 02:19          Magnesium, Serum: 1.9 mg/dL (01-10 @ 02:19)          RADIOLOGY & ADDITIONAL TESTS:

## 2017-01-10 NOTE — PROGRESS NOTE ADULT - ASSESSMENT
77y/o male with PMH HTN, PAF on Eliquis, BPH, NICM EF 20%, right sided HF, AICD, DM presented with feeling weak, with difficulty ambulating. Patient found to have dehydration and diuretics initially discontinued.  Patient found to have ascites and underwent paracentesis with 13Liters removed.  Patient restarted on Demadex.  ARF improving slowly.

## 2017-01-10 NOTE — PROGRESS NOTE ADULT - ASSESSMENT
HAZEL on CKD  continue to hold diuretics Cr trending down 1.5 today  - improved w gentle IVF   - NO hydro , + ascites on ultrasound  ACE , lasix and digoxin on hold  paracentesis done 4 days ago  cardiology follow up noted

## 2017-01-10 NOTE — PROGRESS NOTE ADULT - SUBJECTIVE AND OBJECTIVE BOX
CC: weakness. near syncope     HPI:  75y/o male with PMH HTN, PAF on Eliquis, BPH, NICM EF 20%, right sided HF, AICD, DM presented with feeling weak, with difficulty ambulating. Patient found to have dehydration and diuretics initially discontinued.  Patient found to have ascites and underwent paracentesis with 13Liters removed.  Patient restarted on Demadex.     INTERVAL HPI/OVERNIGHT EVENTS: Patient denies any SOB, cough, chest pain, or abdominal pain.  Spouse at bedside.  Reports that he was not sleeping well last night.  Patient complains of "pins and needles" in feet, worse at night.      Vital Signs Last 24 Hrs  T(C): 36.6, Max: 36.9 (01-09 @ 15:00)  T(F): 97.9, Max: 98.5 (01-09 @ 15:00)  HR: 56 (56 - 72)  BP: 120/60 (110/56 - 132/68)  BP(mean): 0 (0 - 0)  RR: 16 (15 - 18)  SpO2: 93% (92% - 98%)  I&O's Detail    Telemetry: SR 60s with PVCs    PHYSICAL EXAM:  GENERAL: NAD, well-groomed, well-developed  HEAD:  Atraumatic, Normocephalic  EYES: EOMI, PERRLA, conjunctiva and sclera clear  NECK: Supple, No JVD, Normal thyroid  NERVOUS SYSTEM:  Alert, Good concentration; No focal deficits  CHEST/LUNG: Clear to auscultation bilaterally; No rales, rhonchi, wheezing, or rubs  HEART: Regular rate and rhythm; No murmurs, rubs, or gallops  ABDOMEN: Firm, Nontender, mild distention; Bowel sounds present  EXTREMITIES:  2+ Peripheral Pulses, No clubbing or cyanosis; Bilateral LE edema  LYMPH: No lymphadenopathy noted  SKIN: No rashes or lesions                          8.3    4.89  )-----------( 153      ( 10 Jeremi 2017 02:19 )             25.8     10 Jan 2017 02:19    139    |  105    |  33.0   ----------------------------<  97     4.0     |  25.0   |  1.57     Ca    8.2        10 Jeremi 2017 02:19  Mg     1.9       10 Jeremi 2017 02:19        CAPILLARY BLOOD GLUCOSE  104 (10 Jeremi 2017 12:04)  88 (10 Jeremi 2017 08:49)  103 (09 Jan 2017 20:57)  97 (09 Jan 2017 17:25)        Hemoglobin A1C, Whole Blood: 5.2 % (01-06 @ 08:53)    MEDICATIONS  (STANDING):  tamsulosin 0.4milliGRAM(s) Oral at bedtime  isosorbide   mononitrate ER Tablet (IMDUR) 30milliGRAM(s) Oral daily  citalopram 20milliGRAM(s) Oral daily  atorvastatin 20milliGRAM(s) Oral at bedtime  buDESOnide 160 MICROgram(s)/formoterol 4.5 MICROgram(s) Inhaler 1Puff(s) Inhalation two times a day  donepezil 10milliGRAM(s) Oral at bedtime  pantoprazole    Tablet 40milliGRAM(s) Oral before breakfast  oxybutynin 5milliGRAM(s) Oral three times a day  insulin lispro (HumaLOG) corrective regimen sliding scale  SubCutaneous three times a day before meals  dextrose 5%. 1000milliLiter(s) IV Continuous <Continuous>  dextrose 50% Injectable 12.5Gram(s) IV Push once  dextrose 50% Injectable 25Gram(s) IV Push once  dextrose 50% Injectable 25Gram(s) IV Push once  ferrous    sulfate 325milliGRAM(s) Oral daily  influenza   Vaccine 0.5milliLiter(s) IntraMuscular once  epoetin damon Injectable 69124Liqg(s) SubCutaneous every 7 days  folic acid 1milliGRAM(s) Oral daily  metoprolol succinate ER 50milliGRAM(s) Oral daily  hydrALAZINE 25milliGRAM(s) Oral three times a day  apixaban 2.5milliGRAM(s) Oral two times a day  torsemide 20milliGRAM(s) Oral daily  iron sucrose IVPB 100milliGRAM(s) IV Intermittent daily    MEDICATIONS  (PRN):  dextrose Gel 1Dose(s) Oral once PRN Blood Glucose LESS THAN 70 milliGRAM(s)/deciliter  glucagon  Injectable 1milliGRAM(s) IntraMuscular once PRN Glucose LESS THAN 70 milligrams/deciliter  ALBUTerol    90 MICROgram(s) HFA Inhaler 1Puff(s) Inhalation four times a day PRN Shortness of Breath and/or Wheezing

## 2017-01-11 LAB
CULTURE RESULTS: SIGNIFICANT CHANGE UP
SPECIMEN SOURCE: SIGNIFICANT CHANGE UP

## 2017-01-11 PROCEDURE — 99233 SBSQ HOSP IP/OBS HIGH 50: CPT

## 2017-01-11 PROCEDURE — 74022 RADEX COMPL AQT ABD SERIES: CPT | Mod: 26

## 2017-01-11 PROCEDURE — 93010 ELECTROCARDIOGRAM REPORT: CPT

## 2017-01-11 RX ORDER — ONDANSETRON 8 MG/1
4 TABLET, FILM COATED ORAL EVERY 6 HOURS
Qty: 0 | Refills: 0 | Status: DISCONTINUED | OUTPATIENT
Start: 2017-01-11 | End: 2017-01-12

## 2017-01-11 RX ADMIN — Medication 25 MILLIGRAM(S): at 05:59

## 2017-01-11 RX ADMIN — APIXABAN 2.5 MILLIGRAM(S): 2.5 TABLET, FILM COATED ORAL at 09:51

## 2017-01-11 RX ADMIN — TAMSULOSIN HYDROCHLORIDE 0.4 MILLIGRAM(S): 0.4 CAPSULE ORAL at 22:36

## 2017-01-11 RX ADMIN — BUDESONIDE AND FORMOTEROL FUMARATE DIHYDRATE 1 PUFF(S): 160; 4.5 AEROSOL RESPIRATORY (INHALATION) at 08:38

## 2017-01-11 RX ADMIN — ATORVASTATIN CALCIUM 20 MILLIGRAM(S): 80 TABLET, FILM COATED ORAL at 22:35

## 2017-01-11 RX ADMIN — PANTOPRAZOLE SODIUM 40 MILLIGRAM(S): 20 TABLET, DELAYED RELEASE ORAL at 05:59

## 2017-01-11 RX ADMIN — Medication 50 MILLIGRAM(S): at 05:59

## 2017-01-11 RX ADMIN — DONEPEZIL HYDROCHLORIDE 10 MILLIGRAM(S): 10 TABLET, FILM COATED ORAL at 22:35

## 2017-01-11 RX ADMIN — Medication 25 MILLIGRAM(S): at 16:18

## 2017-01-11 RX ADMIN — Medication 20 MILLIGRAM(S): at 05:59

## 2017-01-11 RX ADMIN — Medication 30 MILLILITER(S): at 16:18

## 2017-01-11 RX ADMIN — Medication 5 MILLIGRAM(S): at 16:18

## 2017-01-11 RX ADMIN — Medication 5 MILLIGRAM(S): at 05:59

## 2017-01-11 RX ADMIN — Medication 5 MILLIGRAM(S): at 22:35

## 2017-01-11 RX ADMIN — APIXABAN 2.5 MILLIGRAM(S): 2.5 TABLET, FILM COATED ORAL at 22:35

## 2017-01-11 RX ADMIN — Medication 25 MILLIGRAM(S): at 22:35

## 2017-01-11 RX ADMIN — BUDESONIDE AND FORMOTEROL FUMARATE DIHYDRATE 1 PUFF(S): 160; 4.5 AEROSOL RESPIRATORY (INHALATION) at 21:23

## 2017-01-11 RX ADMIN — RISPERIDONE 0.5 MILLIGRAM(S): 4 TABLET ORAL at 22:35

## 2017-01-11 NOTE — DISCHARGE NOTE ADULT - MEDICATION SUMMARY - MEDICATIONS TO TAKE
I will START or STAY ON the medications listed below when I get home from the hospital:    spironolactone 25 mg oral tablet  -- 1 tab(s) by mouth 2 times a day  -- It is very important that you take or use this exactly as directed.  Do not skip doses or discontinue unless directed by your doctor.  May cause drowsiness or dizziness.    -- Indication: For Acute systolic (congestive) heart failure    tamsulosin 0.4 mg oral capsule  -- 1 cap(s) by mouth once a day (at bedtime)  -- Indication: For BPH    isosorbide mononitrate 30 mg oral tablet, extended release  -- 1 tab(s) by mouth once a day  -- Indication: For Acute combined systolic and diastolic congestive heart failure    apixaban 2.5 mg oral tablet  -- 1 tab(s) by mouth 2 times a day  -- Indication: For PAF (paroxysmal atrial fibrillation)    gabapentin 300 mg oral capsule  -- 1 cap(s) by mouth once a day (at bedtime)  -- Indication: For Diabetic neuropathy    citalopram 20 mg oral tablet  -- 1 tab(s) by mouth once a day  -- Indication: For Depression     SITagliptin 50 mg oral tablet  -- 1 tab(s) by mouth once a day  -- Indication: For Diabetes mellitus    atorvastatin 20 mg oral tablet  -- 1 tab(s) by mouth once a day (at bedtime)  -- Indication: For hyperlipidemia    metoprolol succinate 50 mg oral tablet, extended release  -- 1 tab(s) by mouth once a day  -- Indication: For Essential hypertension    Ventolin HFA 90 mcg/inh inhalation aerosol  -- 2 puff(s) inhaled 4 times a day  -- Indication: For whezzing    budesonide-formoterol 160 mcg-4.5 mcg/inh inhalation aerosol  -- 2 puff(s) inhaled 2 times a day  -- Indication: For wheezing    donepezil 10 mg oral tablet  -- 1 tab(s) by mouth once a day (at bedtime)  -- Indication: For Dementia    torsemide 20 mg oral tablet  -- 1 tab(s) by mouth once a day  -- Indication: For Acute combined systolic and diastolic congestive heart failure    ferrous sulfate 324 mg (65 mg elemental iron) oral tablet  --  by mouth 2 times a day  -- Indication: For Anemia, unspecified type    pantoprazole 40 mg oral delayed release tablet  -- 1 tab(s) by mouth once a day (before a meal)  -- Indication: For GERD    oxybutynin 5 mg oral tablet  -- 1 tab(s) by mouth 3 times a day  -- Indication: For Overactive bladder    hydrALAZINE 25 mg oral tablet  -- 1 tab(s) by mouth 3 times a day  -- Indication: For Essential hypertension    folic acid 1 mg oral tablet  -- 1 tab(s) by mouth once a day  -- Indication: For general

## 2017-01-11 NOTE — PROGRESS NOTE ADULT - PROBLEM SELECTOR PROBLEM 6
PAF (paroxysmal atrial fibrillation)

## 2017-01-11 NOTE — PROGRESS NOTE ADULT - SUBJECTIVE AND OBJECTIVE BOX
NEPHROLOGY INTERVAL HPI/OVERNIGHT EVENTS:  pt doing well  lying flat earlier without distress  no cp, sob, n/v/d    MEDICATIONS  (STANDING):  tamsulosin 0.4milliGRAM(s) Oral at bedtime  isosorbide   mononitrate ER Tablet (IMDUR) 30milliGRAM(s) Oral daily  citalopram 20milliGRAM(s) Oral daily  atorvastatin 20milliGRAM(s) Oral at bedtime  buDESOnide 160 MICROgram(s)/formoterol 4.5 MICROgram(s) Inhaler 1Puff(s) Inhalation two times a day  donepezil 10milliGRAM(s) Oral at bedtime  pantoprazole    Tablet 40milliGRAM(s) Oral before breakfast  oxybutynin 5milliGRAM(s) Oral three times a day  insulin lispro (HumaLOG) corrective regimen sliding scale  SubCutaneous three times a day before meals  dextrose 5%. 1000milliLiter(s) IV Continuous <Continuous>  dextrose 50% Injectable 12.5Gram(s) IV Push once  dextrose 50% Injectable 25Gram(s) IV Push once  dextrose 50% Injectable 25Gram(s) IV Push once  ferrous    sulfate 325milliGRAM(s) Oral daily  influenza   Vaccine 0.5milliLiter(s) IntraMuscular once  epoetin damon Injectable 62232Juat(s) SubCutaneous every 7 days  folic acid 1milliGRAM(s) Oral daily  metoprolol succinate ER 50milliGRAM(s) Oral daily  hydrALAZINE 25milliGRAM(s) Oral three times a day  apixaban 2.5milliGRAM(s) Oral two times a day  torsemide 20milliGRAM(s) Oral daily  iron sucrose IVPB 100milliGRAM(s) IV Intermittent daily  risperiDONE   Tablet 0.5milliGRAM(s) Oral at bedtime    MEDICATIONS  (PRN):  dextrose Gel 1Dose(s) Oral once PRN Blood Glucose LESS THAN 70 milliGRAM(s)/deciliter  glucagon  Injectable 1milliGRAM(s) IntraMuscular once PRN Glucose LESS THAN 70 milligrams/deciliter  ALBUTerol    90 MICROgram(s) HFA Inhaler 1Puff(s) Inhalation four times a day PRN Shortness of Breath and/or Wheezing  ondansetron Injectable 4milliGRAM(s) IV Push every 6 hours PRN Nausea  aluminum hydroxide/magnesium hydroxide/simethicone Suspension 30milliLiter(s) Oral every 4 hours PRN Dyspepsia      Allergies    No Known Allergies    Intolerances    Vital Signs Last 24 Hrs  T(C): 36.6, Max: 36.9 (01-10 @ 21:05)  T(F): 97.9, Max: 98.5 (01-10 @ 21:05)  HR: 60 (56 - 76)  BP: 104/50 (104/50 - 126/60)  BP(mean): 0 (0 - 0)  RR: 16 (12 - 16)  SpO2: 96% (93% - 99%)    PHYSICAL EXAM:    GENERAL: NAD  HEAD:  Atraumatic, Normocephalic  EYES: EOMI, PERRLA, conjunctiva and sclera clear  NECK: Supple, No JVD  NERVOUS SYSTEM:  Alert & Oriented X3  CHEST/LUNG: Clear with diminished BS at bases  HEART: No rub  ABDOMEN: Soft, Nontender, Nondistended; Bowel sounds present  EXTREMITIES:  2+ Peripheral Pulses, Tr edema  LYMPH: No lymphadenopathy noted  SKIN: No rashes or lesions    LABS:                        8.3    4.89  )-----------( 153      ( 10 Jeremi 2017 02:19 )             25.8     10 Jan 2017 02:19    139    |  105    |  33.0   ----------------------------<  97     4.0     |  25.0   |  1.57     Ca    8.2        10 Jeremi 2017 02:19  Mg     1.9       10 Jeremi 2017 02:19              RADIOLOGY & ADDITIONAL TESTS:     EXAM:  US KIDNEY(S)                          PROCEDURE DATE:  01/05/2017        INTERPRETATION:  CLINICAL INFORMATION: Acute on chronic renal failure    COMPARISON: CT 9/16    TECHNIQUE: Sonography of the kidneys and bladder.     FINDINGS:    Right kidney:  11 cm. No hydronephrosis or gross calculi.    Left kidney:  9.4 cm. No hydronephrosis or gross calculi.    Both kidneys with increased echogenicity and cortical atrophy.    Urinary bladder: Nondistended, limited evaluation.    Large volume ascites.    IMPRESSION:     Medical renal disease, no hydronephrosis.

## 2017-01-11 NOTE — PROGRESS NOTE ADULT - PROBLEM SELECTOR PROBLEM 3
Essential hypertension
HAZEL (acute kidney injury)

## 2017-01-11 NOTE — PROGRESS NOTE ADULT - PROBLEM SELECTOR PLAN 1
Continue Demadex as per Cardiology.  Continue Metoprolol.  No ACE-I/ARB secondary to HAZEL.
Continue Demadex as per Cardiology.  Continue Metoprolol.  No ACE-I/ARB secondary to HAZEL.  await final cardio recs
Continue Demadex as per Cardiology.  Continue Metoprolol.  No ACE-I/ARB secondary to HAZEL.
continue imdur/hydralazine, hold ACEI due to elevated creatinine, increase hydralazine to 25 mg three times daily. Holding diuretics , going for paracentesis today.

## 2017-01-11 NOTE — DISCHARGE NOTE ADULT - PATIENT PORTAL LINK FT
“You can access the [employer] Patient Portal, offered by [facility], by registering with the following website: [email or web address]”

## 2017-01-11 NOTE — PROGRESS NOTE ADULT - SUBJECTIVE AND OBJECTIVE BOX
CC: weakness. near syncope - improved    HPI:  77y/o male with PMH HTN, PAF on Eliquis, BPH, NICM EF 20%, right sided HF, AICD, DM presented with feeling weak, with difficulty ambulating. Patient found to have dehydration and diuretics initially discontinued.  Patient found to have ascites and underwent paracentesis with 13Liters removed.  Patient restarted on Demadex.     INTERVAL HPI/OVERNIGHT EVENTS: c/o abdominal pain in am , resolved after a BM, ambulates with walker with assistance    Vital Signs Last 24 Hrs  T(C): 36.6, Max: 36.9 (01-10 @ 21:05)  T(F): 97.9, Max: 98.5 (01-10 @ 21:05)  HR: 60 (58 - 76)  BP: 104/50 (104/50 - 126/60)  BP(mean): --  RR: 16 (12 - 16)  SpO2: 96% (95% - 99%)    Telemetry: SR 60s with PVCs    PHYSICAL EXAM:  GENERAL: NAD, well-groomed, well-developed  HEAD:  Atraumatic, Normocephalic  EYES: EOMI, PERRLA, conjunctiva and sclera clear  NECK: Supple, No JVD, Normal thyroid  NERVOUS SYSTEM:  Alert, Good concentration; No focal deficits  CHEST/LUNG: Clear to auscultation bilaterally; No rales, rhonchi, wheezing, or rubs  HEART: Regular rate and rhythm; No murmurs, rubs, or gallops  ABDOMEN: Firm, Nontender, mild distention; Bowel sounds present  EXTREMITIES:  2+ Peripheral Pulses, No clubbing or cyanosis; Bilateral LE edema -trace                            8.3    4.89  )-----------( 153      ( 10 Jeremi 2017 02:19 )             25.8     10 Jeremi 2017 02:19    139    |  105    |  33.0   ----------------------------<  97     4.0     |  25.0   |  1.57     Ca    8.2        10 Jeremi 2017 02:19  Mg     1.9       10 Jeremi 2017 02:19        CAPILLARY BLOOD GLUCOSE  104 (10 Jeremi 2017 12:04)  88 (10 Jeremi 2017 08:49)  103 (09 Jan 2017 20:57)  97 (09 Jan 2017 17:25)        Hemoglobin A1C, Whole Blood: 5.2 % (01-06 @ 08:53)    MEDICATIONS  (STANDING):  tamsulosin 0.4milliGRAM(s) Oral at bedtime  isosorbide   mononitrate ER Tablet (IMDUR) 30milliGRAM(s) Oral daily  citalopram 20milliGRAM(s) Oral daily  atorvastatin 20milliGRAM(s) Oral at bedtime  buDESOnide 160 MICROgram(s)/formoterol 4.5 MICROgram(s) Inhaler 1Puff(s) Inhalation two times a day  donepezil 10milliGRAM(s) Oral at bedtime  pantoprazole    Tablet 40milliGRAM(s) Oral before breakfast  oxybutynin 5milliGRAM(s) Oral three times a day  insulin lispro (HumaLOG) corrective regimen sliding scale  SubCutaneous three times a day before meals  dextrose 5%. 1000milliLiter(s) IV Continuous <Continuous>  dextrose 50% Injectable 12.5Gram(s) IV Push once  dextrose 50% Injectable 25Gram(s) IV Push once  dextrose 50% Injectable 25Gram(s) IV Push once  ferrous    sulfate 325milliGRAM(s) Oral daily  influenza   Vaccine 0.5milliLiter(s) IntraMuscular once  epoetin damon Injectable 03880Mckf(s) SubCutaneous every 7 days  folic acid 1milliGRAM(s) Oral daily  metoprolol succinate ER 50milliGRAM(s) Oral daily  hydrALAZINE 25milliGRAM(s) Oral three times a day  apixaban 2.5milliGRAM(s) Oral two times a day  torsemide 20milliGRAM(s) Oral daily  iron sucrose IVPB 100milliGRAM(s) IV Intermittent daily  risperiDONE   Tablet 0.5milliGRAM(s) Oral at bedtime    MEDICATIONS  (PRN):  dextrose Gel 1Dose(s) Oral once PRN Blood Glucose LESS THAN 70 milliGRAM(s)/deciliter  glucagon  Injectable 1milliGRAM(s) IntraMuscular once PRN Glucose LESS THAN 70 milligrams/deciliter  ALBUTerol    90 MICROgram(s) HFA Inhaler 1Puff(s) Inhalation four times a day PRN Shortness of Breath and/or Wheezing  ondansetron Injectable 4milliGRAM(s) IV Push every 6 hours PRN Nausea  aluminum hydroxide/magnesium hydroxide/simethicone Suspension 30milliLiter(s) Oral every 4 hours PRN Dyspepsia

## 2017-01-11 NOTE — PROGRESS NOTE ADULT - PROBLEM SELECTOR PROBLEM 1
Acute combined systolic and diastolic congestive heart failure
Acute combined systolic and diastolic congestive heart failure
Acute systolic (congestive) heart failure
Acute combined systolic and diastolic congestive heart failure

## 2017-01-11 NOTE — DIETITIAN INITIAL EVALUATION ADULT. - OTHER INFO
Unable to obtain nutrition hx from Pt. Per EMR,  Pt with good >75% PO intake, on renal, cons cho diet + Glucerna TID. Pt w/ hx of CKD III, DM. Slight wt flucts noted likely 2/2 fluid retention (ascites) 2+ BLE. No noted GI distress.

## 2017-01-11 NOTE — PROGRESS NOTE ADULT - PROBLEM SELECTOR PLAN 3
Improving.  Monitor on Demadex.
Improving.  Monitor on Demadex.
Slowly improving.  Monitor on Demadex.
increase hydralazine to 25 mg three times daily. continue imdur/ BB

## 2017-01-11 NOTE — PROGRESS NOTE ADULT - PROBLEM SELECTOR PLAN 5
Controlled with Imdur, Metoprolol, Hydralazine, and Demadex.
paracentesis today

## 2017-01-11 NOTE — DISCHARGE NOTE ADULT - HOSPITAL COURSE
75y/o male with PMH HTN, PAF on Eliquis, BPH, NICM EF 20%, right sided HF, AICD, DM presented with feeling weak, with difficulty ambulating. Patient found to have dehydration and diuretics initially discontinued.  Patient found to have ascites and underwent paracentesis with 13Liters removed.  Patient restarted on Demadex.  ARF improving slowly. 77y/o male with PMH HTN, PAF on Eliquis, BPH, NICM EF 20%, right sided HF, AICD, DM presented with feeling weak, with difficulty ambulating. Patient found to have dehydration and diuretics initially discontinued.  Patient found to have ascites and underwent paracentesis with 13Liters removed. he clinically improved. His HAZEL on CKD improved with Cr 1.6 on discharge. Pt was restarted on torsemide. Will need close f/u with PMD, cardio and renal  with repat labs. Overall severe right sided heart failure with poor overall prognosis, severe anemia, hihg risk of re-hospitalization was discussed with pt and wife. pt refused blood transfusions, intermittently refused nursing care, verbally abusive to staff -  seems to be 2/2 his personality, he is reasonable and rationale at other times, this was discussed with wife who is not concerned about his behaviour at this time.   his discharge plan was discissed with cardiology, pt and wife in details.    Vital Signs Last 24 Hrs  T(C): 36.8, Max: 36.9 (01-11 @ 22:26)  T(F): 98.2, Max: 98.5 (01-11 @ 22:26)  HR: 74 (58 - 75)  BP: 124/66 (122/60 - 138/74)  BP(mean): --  RR: 18 (16 - 18)  SpO2: 99% (96% - 99%)    PHYSICAL EXAM:    GENERAL: NAD, well-groomed, well-developed  HEAD:  Atraumatic, Normocephalic  EYES: EOMI, PERRLA, conjunctiva and sclera clear  ENMT:  No lesions  NECK: Supple, No JVD  NERVOUS SYSTEM:  Alert & Oriented X3, Good concentration; Motor Strength 5/5 B/L upper and lower extremities; DTRs 2+ intact and symmetric  CHEST/LUNG: Clear to percussion bilaterally; No rales, rhonchi, wheezing, or rubs  HEART: Regular rate and rhythm; No murmurs, rubs, or gallops - pacemaker+  ABDOMEN: Soft, distended; Bowel sounds present  EXTREMITIES:  2+ Peripheral Pulses, No clubbing, cyanosis, or edema    Time spent in discharge planning and co-ordination : 65min

## 2017-01-11 NOTE — DISCHARGE NOTE ADULT - MEDICATION SUMMARY - MEDICATIONS TO STOP TAKING
I will STOP taking the medications listed below when I get home from the hospital:    amitriptyline 10 mg oral tablet  -- 1 tab(s) by mouth once a day (at bedtime)    oxyCODONE-acetaminophen 5 mg-325 mg/5 mL oral solution  -- 10 milliliter(s) by mouth every 8 hours

## 2017-01-11 NOTE — DISCHARGE NOTE ADULT - PLAN OF CARE
maintain fluid status f/u with cardiology in 2-3 weeks  activity as tolerated  Diet - DASH carb controlled diet  Weigh yourself daily.  Low sodium diet.  if you weight 3 lbs over your dry weight, you should call your physician/cardiologist.  take your medications daily. resolved f/u with renal in 2-3 weeeks  F/u with PMD for repeat CBC& BMP in 1 week  Home care

## 2017-01-11 NOTE — PROGRESS NOTE ADULT - SUBJECTIVE AND OBJECTIVE BOX
Chief Complaint:  indigestion, stating "I felt nauseous after breakfast.        Assessment:  Denies fever, chills, chest pain, cough, sob, v/d + for constipation nausea, and indigestion.   A and O x 3, frail, ill appearing, skin warm and dry, RR wnl for rate and rhythm color pink, abd distended, decreased bowel sounds, + dullness noted. + for abd pain upon palpitations          Plan:  Indigestion  Stat Ekg and abd x ray  Informed Dr Esposito and agreed with plan of care to follow up  Mylanta and zofram as ordered  continue to monitor and notify MD with increasing symptoms and results

## 2017-01-11 NOTE — DISCHARGE NOTE ADULT - CARE PLAN
Principal Discharge DX:	Acute combined systolic and diastolic congestive heart failure  Goal:	maintain fluid status  Instructions for follow-up, activity and diet:	f/u with cardiology in 2-3 weeks  activity as tolerated  Diet - DASH carb controlled diet  Weigh yourself daily.  Low sodium diet.  if you weight 3 lbs over your dry weight, you should call your physician/cardiologist.  take your medications daily.  Secondary Diagnosis:	HAZEL (acute kidney injury)  Goal:	resolved  Instructions for follow-up, activity and diet:	f/u with renal in 2-3 weeeks  F/u with PMD for repeat CBC& BMP in 1 week  Home care  Secondary Diagnosis:	Anemia, unspecified type  Secondary Diagnosis:	Other ascites  Secondary Diagnosis:	PAF (paroxysmal atrial fibrillation)  Secondary Diagnosis:	Syncope, unspecified syncope type  Secondary Diagnosis:	Diabetes mellitus

## 2017-01-11 NOTE — PROGRESS NOTE ADULT - PROBLEM SELECTOR PLAN 2
s/p paracentesis.  Continue Demadex.
improving , nephrology following

## 2017-01-11 NOTE — DISCHARGE NOTE ADULT - CARE PROVIDER_API CALL
Awais Khan), Cardiovascular Disease  [address] [place] [address]  Phone: [phone]  Fax: [phone]

## 2017-01-11 NOTE — PROGRESS NOTE ADULT - ATTENDING COMMENTS
DC planning - discussed with pt and wife on speaker phone at bedside, HOme with home care in am   both in agreement.
I saw the patient, easily irritable. Wants to go home  DC planning  CArdio recs  PT eval - [facility] Vs home/home PT
On my exam - pt very paranoid - refused to get blood transfusion as "you want to poison me" " you have all bad news for me." refused physical exam and  told me to get out of here - very verbally abusive. Unsure of his baseline mental state - per RN - pt was very abusive earlier this am but then flipped to nice personality in few hours. unsure of his decision making capacity. Has been asking to go home over the weekend.I tried calling wife - at the number listed - no response - left voicemail.  Will start iv venofer for now  Await family input  Psych consult

## 2017-01-11 NOTE — PROGRESS NOTE ADULT - PROBLEM SELECTOR PLAN 4
Asymptomatic.  Monitor CBC.  Continue Epogen and Venofer.
Asymptomatic.  Monitor CBC.  Continue Epogen and Venofer.
Asymptomatic.  Monitor CBC.  Continue Epogen.
ok to resume BB since pacemaker has a ICD backup rate of 40, hx of paroxysmal afib, ok to continue on eliquis 2.5 mg BID ( age > 80 and creatinine > 1.5)

## 2017-01-11 NOTE — DISCHARGE NOTE ADULT - SECONDARY DIAGNOSIS.
HAZEL (acute kidney injury) Anemia, unspecified type Other ascites PAF (paroxysmal atrial fibrillation) Syncope, unspecified syncope type Diabetes mellitus

## 2017-01-11 NOTE — PROGRESS NOTE ADULT - ASSESSMENT
HAZEL on CKD (III)==> resolving and approaching baseline  s/p recent paracentesis  -  gentle IVF   - ACE on hold  - monitor on Torsemide    Anemia: Fe deficiency  - cont IV Fe and epo  - target Hgb=10.0

## 2017-01-11 NOTE — DIETITIAN INITIAL EVALUATION ADULT. - DIET TYPE
renal replacement pts:no protein restr,no conc K & phos, low sodium/consistent carbohydrate (evening snack)

## 2017-01-11 NOTE — DISCHARGE NOTE ADULT - MEDICATION SUMMARY - MEDICATIONS TO CHANGE
I will SWITCH the dose or number of times a day I take the medications listed below when I get home from the hospital:    hydrALAZINE 10 mg oral tablet  -- 1 tab(s) by mouth 3 times a day

## 2017-01-11 NOTE — PROGRESS NOTE ADULT - PROBLEM SELECTOR PLAN 6
Rate controlled.  Continue Eliquis.

## 2017-01-12 VITALS
DIASTOLIC BLOOD PRESSURE: 70 MMHG | HEART RATE: 76 BPM | OXYGEN SATURATION: 97 % | RESPIRATION RATE: 18 BRPM | SYSTOLIC BLOOD PRESSURE: 122 MMHG | TEMPERATURE: 98 F

## 2017-01-12 LAB
ANION GAP SERPL CALC-SCNC: 10 MMOL/L — SIGNIFICANT CHANGE UP (ref 5–17)
BUN SERPL-MCNC: 31 MG/DL — HIGH (ref 8–20)
CALCIUM SERPL-MCNC: 8.7 MG/DL — SIGNIFICANT CHANGE UP (ref 8.6–10.2)
CHLORIDE SERPL-SCNC: 100 MMOL/L — SIGNIFICANT CHANGE UP (ref 98–107)
CO2 SERPL-SCNC: 27 MMOL/L — SIGNIFICANT CHANGE UP (ref 22–29)
CREAT SERPL-MCNC: 1.67 MG/DL — HIGH (ref 0.5–1.3)
GLUCOSE SERPL-MCNC: 102 MG/DL — SIGNIFICANT CHANGE UP (ref 70–115)
HCT VFR BLD CALC: 30.5 % — LOW (ref 42–52)
HGB BLD-MCNC: 9.5 G/DL — LOW (ref 14–18)
IRON SATN MFR SERPL: 108 UG/DL — SIGNIFICANT CHANGE UP (ref 59–158)
IRON SATN MFR SERPL: 45 % — SIGNIFICANT CHANGE UP (ref 16–55)
MCHC RBC-ENTMCNC: 30.6 PG — SIGNIFICANT CHANGE UP (ref 27–31)
MCHC RBC-ENTMCNC: 31.1 G/DL — LOW (ref 32–36)
MCV RBC AUTO: 98.4 FL — HIGH (ref 80–94)
PLATELET # BLD AUTO: 192 K/UL — SIGNIFICANT CHANGE UP (ref 150–400)
POTASSIUM SERPL-MCNC: 3.7 MMOL/L — SIGNIFICANT CHANGE UP (ref 3.5–5.3)
POTASSIUM SERPL-SCNC: 3.7 MMOL/L — SIGNIFICANT CHANGE UP (ref 3.5–5.3)
RBC # BLD: 3.1 M/UL — LOW (ref 4.6–6.2)
RBC # FLD: 16.8 % — HIGH (ref 11–15.6)
SODIUM SERPL-SCNC: 137 MMOL/L — SIGNIFICANT CHANGE UP (ref 135–145)
TIBC SERPL-MCNC: 239 UG/DL — SIGNIFICANT CHANGE UP (ref 220–430)
TRANSFERRIN SERPL-MCNC: 167 MG/DL — LOW (ref 180–329)
WBC # BLD: 4.18 K/UL — LOW (ref 4.8–10.8)
WBC # FLD AUTO: 4.18 K/UL — LOW (ref 4.8–10.8)

## 2017-01-12 PROCEDURE — 85730 THROMBOPLASTIN TIME PARTIAL: CPT

## 2017-01-12 PROCEDURE — 80162 ASSAY OF DIGOXIN TOTAL: CPT

## 2017-01-12 PROCEDURE — 88305 TISSUE EXAM BY PATHOLOGIST: CPT

## 2017-01-12 PROCEDURE — 76775 US EXAM ABDO BACK WALL LIM: CPT

## 2017-01-12 PROCEDURE — 99239 HOSP IP/OBS DSCHRG MGMT >30: CPT

## 2017-01-12 PROCEDURE — 87070 CULTURE OTHR SPECIMN AEROBIC: CPT

## 2017-01-12 PROCEDURE — 85027 COMPLETE CBC AUTOMATED: CPT

## 2017-01-12 PROCEDURE — 93005 ELECTROCARDIOGRAM TRACING: CPT

## 2017-01-12 PROCEDURE — 85610 PROTHROMBIN TIME: CPT

## 2017-01-12 PROCEDURE — 87206 SMEAR FLUORESCENT/ACID STAI: CPT

## 2017-01-12 PROCEDURE — 83036 HEMOGLOBIN GLYCOSYLATED A1C: CPT

## 2017-01-12 PROCEDURE — 70450 CT HEAD/BRAIN W/O DYE: CPT

## 2017-01-12 PROCEDURE — 87116 MYCOBACTERIA CULTURE: CPT

## 2017-01-12 PROCEDURE — 82042 OTHER SOURCE ALBUMIN QUAN EA: CPT

## 2017-01-12 PROCEDURE — 83550 IRON BINDING TEST: CPT

## 2017-01-12 PROCEDURE — 74022 RADEX COMPL AQT ABD SERIES: CPT

## 2017-01-12 PROCEDURE — 83880 ASSAY OF NATRIURETIC PEPTIDE: CPT

## 2017-01-12 PROCEDURE — 87205 SMEAR GRAM STAIN: CPT

## 2017-01-12 PROCEDURE — 82550 ASSAY OF CK (CPK): CPT

## 2017-01-12 PROCEDURE — 36415 COLL VENOUS BLD VENIPUNCTURE: CPT

## 2017-01-12 PROCEDURE — 87102 FUNGUS ISOLATION CULTURE: CPT

## 2017-01-12 PROCEDURE — 94640 AIRWAY INHALATION TREATMENT: CPT

## 2017-01-12 PROCEDURE — P9047: CPT

## 2017-01-12 PROCEDURE — 84466 ASSAY OF TRANSFERRIN: CPT

## 2017-01-12 PROCEDURE — 76942 ECHO GUIDE FOR BIOPSY: CPT

## 2017-01-12 PROCEDURE — 84484 ASSAY OF TROPONIN QUANT: CPT

## 2017-01-12 PROCEDURE — 86334 IMMUNOFIX E-PHORESIS SERUM: CPT

## 2017-01-12 PROCEDURE — 80053 COMPREHEN METABOLIC PANEL: CPT

## 2017-01-12 PROCEDURE — 99285 EMERGENCY DEPT VISIT HI MDM: CPT | Mod: 25

## 2017-01-12 PROCEDURE — 82945 GLUCOSE OTHER FLUID: CPT

## 2017-01-12 PROCEDURE — 83735 ASSAY OF MAGNESIUM: CPT

## 2017-01-12 PROCEDURE — 87075 CULTR BACTERIA EXCEPT BLOOD: CPT

## 2017-01-12 PROCEDURE — 71045 X-RAY EXAM CHEST 1 VIEW: CPT

## 2017-01-12 PROCEDURE — 88112 CYTOPATH CELL ENHANCE TECH: CPT

## 2017-01-12 PROCEDURE — 89051 BODY FLUID CELL COUNT: CPT

## 2017-01-12 PROCEDURE — 87015 SPECIMEN INFECT AGNT CONCNTJ: CPT

## 2017-01-12 PROCEDURE — 84157 ASSAY OF PROTEIN OTHER: CPT

## 2017-01-12 PROCEDURE — 80048 BASIC METABOLIC PNL TOTAL CA: CPT

## 2017-01-12 PROCEDURE — 83615 LACTATE (LD) (LDH) ENZYME: CPT

## 2017-01-12 PROCEDURE — 97163 PT EVAL HIGH COMPLEX 45 MIN: CPT

## 2017-01-12 PROCEDURE — 76705 ECHO EXAM OF ABDOMEN: CPT

## 2017-01-12 RX ORDER — SPIRONOLACTONE 25 MG/1
1 TABLET, FILM COATED ORAL
Qty: 60 | Refills: 0 | OUTPATIENT
Start: 2017-01-12 | End: 2017-02-11

## 2017-01-12 RX ORDER — FERROUS SULFATE 325(65) MG
0 TABLET ORAL
Qty: 0 | Refills: 0 | COMMUNITY

## 2017-01-12 RX ORDER — PANTOPRAZOLE SODIUM 20 MG/1
1 TABLET, DELAYED RELEASE ORAL
Qty: 0 | Refills: 0 | COMMUNITY

## 2017-01-12 RX ORDER — HYDRALAZINE HCL 50 MG
1 TABLET ORAL
Qty: 90 | Refills: 0 | OUTPATIENT
Start: 2017-01-12 | End: 2017-02-11

## 2017-01-12 RX ADMIN — ISOSORBIDE MONONITRATE 30 MILLIGRAM(S): 60 TABLET, EXTENDED RELEASE ORAL at 12:15

## 2017-01-12 RX ADMIN — Medication 25 MILLIGRAM(S): at 05:42

## 2017-01-12 RX ADMIN — Medication 1 MILLIGRAM(S): at 12:15

## 2017-01-12 RX ADMIN — BUDESONIDE AND FORMOTEROL FUMARATE DIHYDRATE 1 PUFF(S): 160; 4.5 AEROSOL RESPIRATORY (INHALATION) at 10:00

## 2017-01-12 RX ADMIN — Medication 325 MILLIGRAM(S): at 12:15

## 2017-01-12 RX ADMIN — Medication 5 MILLIGRAM(S): at 05:42

## 2017-01-12 RX ADMIN — IRON SUCROSE 210 MILLIGRAM(S): 20 INJECTION, SOLUTION INTRAVENOUS at 12:16

## 2017-01-12 RX ADMIN — PANTOPRAZOLE SODIUM 40 MILLIGRAM(S): 20 TABLET, DELAYED RELEASE ORAL at 05:42

## 2017-01-12 RX ADMIN — Medication 20 MILLIGRAM(S): at 05:42

## 2017-01-12 RX ADMIN — Medication 50 MILLIGRAM(S): at 05:42

## 2017-01-12 RX ADMIN — APIXABAN 2.5 MILLIGRAM(S): 2.5 TABLET, FILM COATED ORAL at 12:15

## 2017-01-12 RX ADMIN — Medication 5 MILLIGRAM(S): at 14:32

## 2017-01-12 RX ADMIN — Medication 25 MILLIGRAM(S): at 15:39

## 2017-01-12 RX ADMIN — CITALOPRAM 20 MILLIGRAM(S): 10 TABLET, FILM COATED ORAL at 12:15

## 2017-01-12 NOTE — PROGRESS NOTE ADULT - SUBJECTIVE AND OBJECTIVE BOX
NEPHROLOGY INTERVAL HPI/OVERNIGHT EVENTS:  pt ambulating and no distress when seen earlier  More confused  Was refusing nursing care    MEDICATIONS  (STANDING):  tamsulosin 0.4milliGRAM(s) Oral at bedtime  isosorbide   mononitrate ER Tablet (IMDUR) 30milliGRAM(s) Oral daily  citalopram 20milliGRAM(s) Oral daily  atorvastatin 20milliGRAM(s) Oral at bedtime  buDESOnide 160 MICROgram(s)/formoterol 4.5 MICROgram(s) Inhaler 1Puff(s) Inhalation two times a day  donepezil 10milliGRAM(s) Oral at bedtime  pantoprazole    Tablet 40milliGRAM(s) Oral before breakfast  oxybutynin 5milliGRAM(s) Oral three times a day  insulin lispro (HumaLOG) corrective regimen sliding scale  SubCutaneous three times a day before meals  dextrose 5%. 1000milliLiter(s) IV Continuous <Continuous>  dextrose 50% Injectable 12.5Gram(s) IV Push once  dextrose 50% Injectable 25Gram(s) IV Push once  dextrose 50% Injectable 25Gram(s) IV Push once  ferrous    sulfate 325milliGRAM(s) Oral daily  influenza   Vaccine 0.5milliLiter(s) IntraMuscular once  epoetin damon Injectable 57348Fomu(s) SubCutaneous every 7 days  folic acid 1milliGRAM(s) Oral daily  metoprolol succinate ER 50milliGRAM(s) Oral daily  hydrALAZINE 25milliGRAM(s) Oral three times a day  apixaban 2.5milliGRAM(s) Oral two times a day  torsemide 20milliGRAM(s) Oral daily  iron sucrose IVPB 100milliGRAM(s) IV Intermittent daily  risperiDONE   Tablet 0.5milliGRAM(s) Oral at bedtime    MEDICATIONS  (PRN):  dextrose Gel 1Dose(s) Oral once PRN Blood Glucose LESS THAN 70 milliGRAM(s)/deciliter  glucagon  Injectable 1milliGRAM(s) IntraMuscular once PRN Glucose LESS THAN 70 milligrams/deciliter  ALBUTerol    90 MICROgram(s) HFA Inhaler 1Puff(s) Inhalation four times a day PRN Shortness of Breath and/or Wheezing  ondansetron Injectable 4milliGRAM(s) IV Push every 6 hours PRN Nausea  aluminum hydroxide/magnesium hydroxide/simethicone Suspension 30milliLiter(s) Oral every 4 hours PRN Dyspepsia      Allergies    No Known Allergies        Vital Signs Last 24 Hrs  T(C): 36.9, Max: 36.9 (01-11 @ 22:26)  T(F): 98.5, Max: 98.5 (01-11 @ 22:26)  HR: 72 (58 - 75)  BP: 122/60 (122/60 - 138/74)  BP(mean): --  RR: 16 (16 - 16)  SpO2: 96% (96% - 99%)    PHYSICAL EXAM:    GENERAL: NAD  HEAD:  Atraumatic, Normocephalic  EYES: EOMI, PERRLA, conjunctiva and sclera bonnie  NECK: Supple, No JVD  NERVOUS SYSTEM:  Awake and alert; confused  CHEST/LUNG: Clear with diminished BS at bases  HEART: No rub  ABDOMEN: Soft, Nontender, Nondistended; Bowel sounds present  EXTREMITIES:  2+ Peripheral Pulses, No edema      LABS:    Creatinine, Serum: 1.57 mg/dL (01.10.17 @ 02:19)                  RADIOLOGY & ADDITIONAL TESTS:

## 2017-01-12 NOTE — PROGRESS NOTE ADULT - ASSESSMENT
75 y/o male with PMHx of non ischemic cardiomyopathy , (EF 20%) , severe rV enlargement and dysfunction and severe TR presented with acute right sided heart failure, ascites and HAZEL s/p paracentesis, and improvement of HAZEL    Problem List  1- NICM EF 20% (possible etiology include amyloidosis given severe concentric LVH on echo and low voltage eKG)   2- Bradycardia , has AICD  3- Paroxysmal Afib currenlty in sinus rhythm  4- Severe RV enlargement with moderately reduced RV function and borderline pulmonary hypertension  5- HAZEL resolved  6- Anemia   7- Ascites s/p paracentesis  7- Diabetes Mellitues  8- Hypertension    Recs:     1- continue torsemide 20 mg as an oupatient , add spironolactone  25 mg daily , check metabolic panel in 1 week  Discussed with family low salt intake, keep track of daily weight, edema and if increased weight from discharge weight today by 5 lbs or worsening edema to call PCP/cardiologist to increase dose of diuretics   2- continue imdur/hydralazine for acute systolic heart failure (no ACEI due to resolving HAZEL )   3- Continue BB   4- Optimum BP control using above meds  5- continue apixiban for paroxysmal AFib and CHADSVASC 5  6- follow up with Dr. Tubbs

## 2017-01-12 NOTE — PROGRESS NOTE ADULT - SUBJECTIVE AND OBJECTIVE BOX
CARDIOLOGY PROGRESS NOTE   ([facility])                             Reason for follow up: right sided heart failure                               Subjective: feels better, no sob, no chest pain,    Review of symptoms: Cardiac:  No chest pain. No dyspnea. No palpitations.  Respiratory:no cough. No dyspnea  Gastrointestinal: No diarrhea. No abdominal pain. No bleeding.     Past medical history: No updates.   Chronic conditions:  Hypertension: controlled. ;    	  Vitals:  T(C): 36.8, Max: 36.9 (01-11 @ 22:26)  HR: 74 (58 - 75)  BP: 124/66 (122/60 - 138/74)  RR: 18 (16 - 18)  SpO2: 99% (96% - 99%)  Wt(kg): --  I&O's Summary        PHYSICAL EXAM:  Appearance: Comfortable. No acute distress, sitting in chair  HEENT:  Head and neck: Atraumatic. Normocephalic.  Normal oral mucosa, PERRL, elevated JVD with systolic expnasion, No carotid bruit.   Neurologic: A & O x 3, no focal deficits. EOMI ,  Lymphatic: No cervical lymphadenopathy  Cardiovascular: Normal S1 S2, No murmur, pansystolic murmur over the LSB increases with inspiration  Respiratory: diminished air entry at the bases  Gastrointestinal:  Soft, Non-tender, + BS  Lower Extremities: No edema  Psychiatry: Patient is calm. No agitation. Mood & affect appropriate  Skin: No rashes, No ecchymoses, No cyanosis    CURRENT MEDICATIONS:  tamsulosin 0.4milliGRAM(s) Oral at bedtime  isosorbide   mononitrate ER Tablet (IMDUR) 30milliGRAM(s) Oral daily  metoprolol succinate ER 50milliGRAM(s) Oral daily  hydrALAZINE 25milliGRAM(s) Oral three times a day  torsemide 20milliGRAM(s) Oral daily    buDESOnide 160 MICROgram(s)/formoterol 4.5 MICROgram(s) Inhaler  citalopram  donepezil  risperiDONE   Tablet  pantoprazole    Tablet  atorvastatin  insulin lispro (HumaLOG) corrective regimen sliding scale  dextrose 50% Injectable  dextrose 50% Injectable  dextrose 50% Injectable  oxybutynin  dextrose 5%.  ferrous    sulfate  influenza   Vaccine  epoetin damon Injectable  folic acid  apixaban  iron sucrose IVPB      LABS:	 	  no labs today    DIAGNOSTIC TESTING:  Echocardiogram:  1. Technically limited study.   2. Apical views were not obtained.   3. Severely decreased global left ventricular systolic function.   4. Left ventricular ejection fraction, by visual estimation, is 20 to   25%.   5. Normal left ventricular internal cavity size.   6. The left ventricular diastolic function could not be assessed in this   study.   7. There is severe concentric left ventricular hypertrophy; LV teqh=172   g/m2.   8. Moderately reduced RV systolic function.   9. Severely enlarged right ventricle.  10. Moderate ascites.  11. The mitral valve leaflets are tethered which is due to reduced   systolic function and elevated LVEDP.  12. The right atrial pressure is severely elevated.  13. Mild mitral valve regurgitation.  14. Severely dilated pulmonary artery.  15. Mal coaptation of TV leaflets.  16. Severe tricuspid regurgitation.  17. Estimated pulmonary artery systolic pressure is 38.6 mmHg assuming a   right atrial pressure of 15 mmHg, which is consistent with borderline   pulmonary hypertension. Underestimation of PASP due to wide open TR.  18. Moderate pleural effusion in both left and right lateral regions.  19. Trivial pericardial effusion.    EKG NSR 1st degree AV block with occasional wenckbakh  [

## 2017-01-12 NOTE — PROGRESS NOTE ADULT - ASSESSMENT
HAZEL on CKD (III)==> resolving and approaching baseline  s/p recent paracentesis   - ACE on hold  - monitor on Torsemide  - check labs    Anemia: Fe deficiency  - cont IV Fe and epo  - target Hgb=10.0

## 2017-01-13 LAB — NON-GYN CYTOLOGY SPEC: SIGNIFICANT CHANGE UP

## 2017-01-28 LAB
CULTURE RESULTS: SIGNIFICANT CHANGE UP
SPECIMEN SOURCE: SIGNIFICANT CHANGE UP

## 2017-02-22 LAB
CULTURE RESULTS: SIGNIFICANT CHANGE UP
SPECIMEN SOURCE: SIGNIFICANT CHANGE UP

## 2018-12-08 NOTE — PROGRESS NOTE ADULT - PROBLEM/PLAN-3
Reason For Visit  RFV: Ольга Helm is patient here today for. Osteoarthritis for Hyalgan inj # 4 Rt knee. Referred By: Mychal Martin      History of Present Illness  HPI - Rheum: 79 year old female arriving for her 4th Hyalgan injection to the Rt knee for osteoarthritis. There was improvement in her Rt knee pain after the 3rd Hyalgan injection a week ago. The Hyalgan injection was also well-tolerated. Allergies   1. Penicillins    Current Meds   1. Advil CAPS; Therapy: (Recorded:89Shr2053) to Recorded   2. Aspirin EC 81 MG Oral Tablet Delayed Release; TAKE 1 TABLET DAILY; Therapy: 39QSC6577 to Recorded   3. Atorvastatin Calcium 80 MG Oral Tablet; TAKE 1 TABLET DAILY; Therapy: 54LLD7126 to Recorded   4. Calcium 500 MG TABS; TAKE 1 TABLET TWICE DAILY; Therapy: (Recorded:83Lpr8574) to Recorded   5. Clopidogrel Bisulfate 75 MG Oral Tablet; TAKE 1 TABLET BY MOUTH EVERY DAY; Therapy: 58OUL7639 to ([phone] [id number] 75) Recorded   6. Fish Oil 1000 MG Oral Capsule; Takes 1 capsule daily; Therapy: 74NKM0595 to Recorded   7. Hyalgan 20 MG/2ML Intra-articular Solution Prefilled Syringe; 1 injection b/l knees; Therapy: 95Wjl3202 to (Last Rx:66Lkg3076) Ordered   8. Levothyroxine Sodium 100 MCG Oral Tablet; TAKE 1 TABLET DAILY; Last Rx:[id number]   Ordered   9. Levothyroxine Sodium 88 MCG Oral Tablet; 1 tab po qod alternating with 100mcg tab; Therapy: 98ZXU3414 to Recorded   10. Metoprolol Succinate ER 25 MG Oral Tablet Extended Release 24 Hour; TAKE 1 TABLET    BY MOUTH DAILY; Therapy: 64GPG0142 to (Evaluate:37Fkd1296); Last Rx:00Cby1134 Ordered   11. Nitroglycerin 0.4 MG Sublingual Tablet Sublingual; DISSOLVE 1 TABLET UNDER THE    TONGUE AS NEEDED FOR CHEST PAIN;    Therapy: 21EBF6159 to Recorded   12. Prolia 60 MG/ML Subcutaneous Solution; INJECT SUBCUTANEOUSLY  60 MG / 1 ML    EVERY 6 MONTHS; Therapy: 47VCN7405 to Recorded   13.  Sodium Chloride 0.9 % Injection Solution; 500 ml IV over
30 minutes; To Be Done:    37SAR1118; Status: HOLD FOR - Administration Ordered   14. TraMADol HCl - 50 MG Oral Tablet; TAKE 1 TABLET BY MOUTH THREE TIMES A DAY AS    NEEDED; Therapy: 54ZDW7827 to (Evaluate:01Nov2018); Last Rx:02Oct2018 Ordered   15. Tylenol CAPS; Therapy: (Recorded:80Rnb9231) to Recorded   16. Vitamin B-12 500 MCG Oral Tablet; TAKE 1 TABLET DAILY; Therapy: 91YTD8891 to Recorded   17. Vitamin D 1000 UNIT CAPS; Therapy: (Recorded:78Yso8322) to Recorded   18. Zolpidem Tartrate 5 MG Oral Tablet; Therapy: (Recorded:14Aal4614) to Recorded    Active Problems    1. Abnormal kidney function (N28.9)   2. Abnormal laboratory test result (R89.9)   3. Acid reflux (K21.9)   4. Arterial thoracic outlet syndrome of right subclavian artery (G54.0)   5. Decreased mobility (R26.89)   6. Decubitus ulcer of buttock (L89.309)   7. DVT of leg (deep venous thrombosis), left (I82.402)   8. Goiter (E04.9)   9. Hypertension (I10)   10. Hypotension (I95.9)   11. Increased urinary frequency (R35.0)   12. Knee pain (M25.569)   13. Osteoarthritis of right glenohumeral joint (M19.011)   14. Osteoarthritis of right knee (M17.11)   15. Osteopenia (M85.80)   16. Palpitations (R00.2)   17. Pedal edema (R60.0)   18. S/P PTCA (percutaneous transluminal coronary angioplasty) (Z98.61)   19. S/P total hip arthroplasty (Z96.649)   20. S/P total knee arthroplasty (Z96.659)   21. Status post total left knee replacement (Z96.652)   22. Throat irritation (J39.2)   23. Visit for screening mammogram (Z12.31)   24. Weakness (R53.1)    Hypothyroidism (244.9) (E03.9)       Dyslipidemia (272.4) (E78.5)       Osteoarthritis, generalized (715.00) (M15.9)       CAD in native artery (414.01) (I25.10)     - Doing well. Currently denies any chest pain. - Last Impression: 29 Sep 2018  Plan:   - Discussed with patient regarding taking dual antiplatelet therapy (aspirin and Plavix) every day.  Discuss about risk of stopping one or both of
the medications prematurely can lead to severe adverse outcome including stent thrombosis, myocardial infarction and/or death.   -Strongly recommend him to enroll in phase 2 cardiac rehab. Benefits of cardiac rehab explained to the patient in detail. I will perform pre-enrollment symptom limited exercise treadmill stress test.   -Recommend to continue Toprol-XL, losartan and Lipitor.   -No LVEF available and will order echocardiogram.  PONCHO CRISTINA (Cardiology)  Exertional shortness of breath (786.05) (R06.02)     - With moderate exertion. Clinically she is euvolemic.     - Last Impression: 29 Sep 2018  Plan:   -She is on Lasix 20 mg twice daily and clinically appears to be euvolemic.   -We will check NT proBNP as well as echocardiogram.   -If her symptoms persistent then will increase the dose of Lasix.   -Strongly encouraged to check weight on daily basis and send a report to us.   -Follow-up in 1-3 months. Diogenes Cabrera (Cardiology)  Arterial thoracic outlet syndrome of left subclavian artery (353.0) (G54.0)          Past Medical History   1. History of osteoporosis (Z87.39)    Surgical History   1. History of Knee Replacement   2. History of Total Hip Replacement    Family History   1. Denied: Family history of thyroid disease   2. No pertinent family history   3. Denied: Family history of thyroid disease   4. No pertinent family history    Social History   Â· Denied: History of Alcohol use (Z78.9)   Â· Never smoker    Vitals  Signs   Recorded: 87JFV8589 34:28EQ   Systolic: 053, RUE, Sitting  Diastolic: 65, RUE, Sitting  Temperature: 97.2 F  Heart Rate: 69    Procedure    Time Out   Physician verified correct patient. Physician verified correct procedure. Indication for Procedure: osteoarthritis. Risk, benefits and bleeding risk were discussed with the patient. Preparation: betadine was used to prep the area. Injection of Rt knee. ethyl chloride spray was used as a topical anesthetic.  Local
anesthetic was administered using a 25-gauge needle. The area was then injected with 5 mL 1% lidocaine. Aseptic technique was utilized. The needle was then directed from a medial aspect. A 21-gauge and 1.5 inch needle was used to inject 2mL Hyalgan . A bandage was applied. the patient tolerated the procedure well. Complications: None. Patient instructed to Avoid excessive weight bearing of the injected area. , Apply ice at the injection site for a couple of hours. and Call if swelling, redness or fever. Follow-up in the office in 1 week(s). Assessment   1. Osteoarthritis of right knee (M17.11)    Plan   1. Hyalgan 20 MG/2ML Intra-articular Solution Prefilled Syringe  Plans: With the patient's consent her Rt knee was injected with Hyalgan and the procedure was well-tolerated. She will return in one week for her 5th Hyalgan injection.       Signatures   Electronically signed by : Zayra Sarmiento M.D.; Nov 22 2018 12:20AM CST (Author)
DISPLAY PLAN FREE TEXT

## 2019-10-22 NOTE — ED PROVIDER NOTE - NS ED MD DISPO ADMIT SSH SERVICE LEVEL
Oculoplastic Surgeon Procedure Text (E): After obtaining clear surgical margins the patient was sent to oculoplastics for surgical repair.  The patient understands they will receive post-surgical care and follow-up from the referring physician's office. 4 [place]stein/CTU

## 2020-06-19 NOTE — PROVIDER CONTACT NOTE (OTHER) - NAME OF MD/NP/PA/DO NOTIFIED:
1. Have you been to the ER, urgent care clinic since your last visit? Hospitalized since your last visit? Yes When: 6/17/2020 ED [facility]-  ED for eright sided abdominal pain. 2. Have you seen or consulted any other health care providers outside of the [address] [place] [address] since your last visit? Include any pap smears or colon screening.  No Dr Dumas
